# Patient Record
Sex: FEMALE | Race: AMERICAN INDIAN OR ALASKA NATIVE | NOT HISPANIC OR LATINO | Employment: UNEMPLOYED | URBAN - METROPOLITAN AREA
[De-identification: names, ages, dates, MRNs, and addresses within clinical notes are randomized per-mention and may not be internally consistent; named-entity substitution may affect disease eponyms.]

---

## 2017-01-19 ENCOUNTER — OFFICE VISIT (OUTPATIENT)
Dept: MATERNAL FETAL MEDICINE | Facility: CLINIC | Age: 42
End: 2017-01-19
Attending: OBSTETRICS & GYNECOLOGY
Payer: COMMERCIAL

## 2017-01-19 ENCOUNTER — HOSPITAL ENCOUNTER (OUTPATIENT)
Dept: ULTRASOUND IMAGING | Facility: CLINIC | Age: 42
Discharge: HOME | End: 2017-01-19
Attending: OBSTETRICS & GYNECOLOGY | Admitting: OBSTETRICS & GYNECOLOGY
Payer: COMMERCIAL

## 2017-01-19 ENCOUNTER — HOSPITAL ENCOUNTER (OUTPATIENT)
Facility: CLINIC | Age: 42
Discharge: HOME | End: 2017-01-19
Attending: OBSTETRICS & GYNECOLOGY | Admitting: OBSTETRICS & GYNECOLOGY
Payer: COMMERCIAL

## 2017-01-19 VITALS
SYSTOLIC BLOOD PRESSURE: 112 MMHG | HEART RATE: 77 BPM | TEMPERATURE: 97.8 F | DIASTOLIC BLOOD PRESSURE: 57 MMHG | RESPIRATION RATE: 18 BRPM

## 2017-01-19 DIAGNOSIS — O35.9XX0 SUSPECTED FETAL ANOMALY, ANTEPARTUM, NOT APPLICABLE OR UNSPECIFIED FETUS: Primary | ICD-10-CM

## 2017-01-19 DIAGNOSIS — O24.119 TYPE 2 DIABETES MELLITUS AFFECTING PREGNANCY, ANTEPARTUM: ICD-10-CM

## 2017-01-19 DIAGNOSIS — O24.119 PRE-EXISTING TYPE 2 DIABETES MELLITUS DURING PREGNANCY, ANTEPARTUM: ICD-10-CM

## 2017-01-19 LAB
ALBUMIN SERPL-MCNC: 2.4 G/DL (ref 3.4–5)
ALBUMIN UR-MCNC: 30 MG/DL
ALP SERPL-CCNC: 104 U/L (ref 40–150)
ALT SERPL W P-5'-P-CCNC: 15 U/L (ref 0–50)
AMPHETAMINES UR QL SCN: NORMAL
ANION GAP SERPL CALCULATED.3IONS-SCNC: 12 MMOL/L (ref 3–14)
APPEARANCE UR: CLEAR
AST SERPL W P-5'-P-CCNC: 15 U/L (ref 0–45)
BACTERIA #/AREA URNS HPF: ABNORMAL /HPF
BILIRUB SERPL-MCNC: 0.4 MG/DL (ref 0.2–1.3)
BILIRUB UR QL STRIP: ABNORMAL
BUN SERPL-MCNC: 6 MG/DL (ref 7–30)
CALCIUM SERPL-MCNC: 8.2 MG/DL (ref 8.5–10.1)
CANNABINOIDS UR QL: NORMAL
CHLORIDE SERPL-SCNC: 108 MMOL/L (ref 94–109)
CO2 SERPL-SCNC: 21 MMOL/L (ref 20–32)
COCAINE UR QL: NORMAL
COLOR UR AUTO: YELLOW
CREAT SERPL-MCNC: 0.45 MG/DL (ref 0.52–1.04)
ERYTHROCYTE [DISTWIDTH] IN BLOOD BY AUTOMATED COUNT: 12.8 % (ref 10–15)
GFR SERPL CREATININE-BSD FRML MDRD: ABNORMAL ML/MIN/1.7M2
GLUCOSE SERPL-MCNC: 116 MG/DL (ref 70–99)
GLUCOSE UR STRIP-MCNC: 70 MG/DL
HCT VFR BLD AUTO: 31.1 % (ref 35–47)
HGB BLD-MCNC: 10.5 G/DL (ref 11.7–15.7)
HGB UR QL STRIP: NEGATIVE
KETONES UR STRIP-MCNC: >150 MG/DL
LEUKOCYTE ESTERASE UR QL STRIP: NEGATIVE
LIPASE SERPL-CCNC: 75 U/L (ref 73–393)
MCH RBC QN AUTO: 27.8 PG (ref 26.5–33)
MCHC RBC AUTO-ENTMCNC: 33.8 G/DL (ref 31.5–36.5)
MCV RBC AUTO: 82 FL (ref 78–100)
MUCOUS THREADS #/AREA URNS LPF: PRESENT /LPF
NITRATE UR QL: NEGATIVE
OPIATES UR QL SCN: NORMAL
PCP UR QL SCN: NORMAL
PH UR STRIP: 6.5 PH (ref 5–7)
PLATELET # BLD AUTO: 322 10E9/L (ref 150–450)
POTASSIUM SERPL-SCNC: 3.6 MMOL/L (ref 3.4–5.3)
PROT SERPL-MCNC: 6.7 G/DL (ref 6.8–8.8)
RBC # BLD AUTO: 3.78 10E12/L (ref 3.8–5.2)
RBC #/AREA URNS AUTO: 0 /HPF (ref 0–2)
SODIUM SERPL-SCNC: 141 MMOL/L (ref 133–144)
SP GR UR STRIP: 1.02 (ref 1–1.03)
SQUAMOUS #/AREA URNS AUTO: 8 /HPF (ref 0–1)
URN SPEC COLLECT METH UR: ABNORMAL
UROBILINOGEN UR STRIP-MCNC: 4 MG/DL (ref 0–2)
WBC # BLD AUTO: 5.4 10E9/L (ref 4–11)
WBC #/AREA URNS AUTO: 0 /HPF (ref 0–2)

## 2017-01-19 PROCEDURE — 83690 ASSAY OF LIPASE: CPT | Performed by: OBSTETRICS & GYNECOLOGY

## 2017-01-19 PROCEDURE — 81001 URINALYSIS AUTO W/SCOPE: CPT | Performed by: OBSTETRICS & GYNECOLOGY

## 2017-01-19 PROCEDURE — 85027 COMPLETE CBC AUTOMATED: CPT | Performed by: OBSTETRICS & GYNECOLOGY

## 2017-01-19 PROCEDURE — 80053 COMPREHEN METABOLIC PANEL: CPT | Performed by: OBSTETRICS & GYNECOLOGY

## 2017-01-19 PROCEDURE — 25800025 ZZH RX 258: Performed by: OBSTETRICS & GYNECOLOGY

## 2017-01-19 PROCEDURE — 36415 COLL VENOUS BLD VENIPUNCTURE: CPT | Performed by: OBSTETRICS & GYNECOLOGY

## 2017-01-19 PROCEDURE — 25900017 H RX MED GY IP 259 OP 259 PS 637: Performed by: OBSTETRICS & GYNECOLOGY

## 2017-01-19 PROCEDURE — 76816 OB US FOLLOW-UP PER FETUS: CPT

## 2017-01-19 PROCEDURE — 25000125 ZZHC RX 250: Performed by: OBSTETRICS & GYNECOLOGY

## 2017-01-19 PROCEDURE — 80307 DRUG TEST PRSMV CHEM ANLYZR: CPT | Performed by: OBSTETRICS & GYNECOLOGY

## 2017-01-19 PROCEDURE — 96375 TX/PRO/DX INJ NEW DRUG ADDON: CPT

## 2017-01-19 PROCEDURE — 96360 HYDRATION IV INFUSION INIT: CPT

## 2017-01-19 PROCEDURE — 99214 OFFICE O/P EST MOD 30 MIN: CPT

## 2017-01-19 PROCEDURE — 25000132 ZZH RX MED GY IP 250 OP 250 PS 637: Performed by: OBSTETRICS & GYNECOLOGY

## 2017-01-19 PROCEDURE — 25000128 H RX IP 250 OP 636: Performed by: OBSTETRICS & GYNECOLOGY

## 2017-01-19 PROCEDURE — 96361 HYDRATE IV INFUSION ADD-ON: CPT

## 2017-01-19 PROCEDURE — 96374 THER/PROPH/DIAG INJ IV PUSH: CPT

## 2017-01-19 RX ORDER — METOCLOPRAMIDE 5 MG/1
5-10 TABLET ORAL 3 TIMES DAILY PRN
Status: DISCONTINUED | OUTPATIENT
Start: 2017-01-19 | End: 2017-01-19 | Stop reason: HOSPADM

## 2017-01-19 RX ORDER — LIDOCAINE 40 MG/G
CREAM TOPICAL
Status: DISCONTINUED | OUTPATIENT
Start: 2017-01-19 | End: 2017-01-19 | Stop reason: HOSPADM

## 2017-01-19 RX ORDER — SODIUM CHLORIDE, SODIUM LACTATE, POTASSIUM CHLORIDE, CALCIUM CHLORIDE 600; 310; 30; 20 MG/100ML; MG/100ML; MG/100ML; MG/100ML
INJECTION, SOLUTION INTRAVENOUS CONTINUOUS
Status: DISCONTINUED | OUTPATIENT
Start: 2017-01-19 | End: 2017-01-19 | Stop reason: HOSPADM

## 2017-01-19 RX ORDER — OXYTOCIN 10 [USP'U]/ML
INJECTION, SOLUTION INTRAMUSCULAR; INTRAVENOUS
Status: DISCONTINUED
Start: 2017-01-19 | End: 2017-01-19 | Stop reason: WASHOUT

## 2017-01-19 RX ORDER — ONDANSETRON 2 MG/ML
4 INJECTION INTRAMUSCULAR; INTRAVENOUS EVERY 6 HOURS PRN
Status: DISCONTINUED | OUTPATIENT
Start: 2017-01-19 | End: 2017-01-19 | Stop reason: HOSPADM

## 2017-01-19 RX ORDER — ALUMINA, MAGNESIA, AND SIMETHICONE 2400; 2400; 240 MG/30ML; MG/30ML; MG/30ML
30 SUSPENSION ORAL ONCE
Status: COMPLETED | OUTPATIENT
Start: 2017-01-19 | End: 2017-01-19

## 2017-01-19 RX ORDER — BUPRENORPHINE AND NALOXONE 8; 2 MG/1; MG/1
1 FILM, SOLUBLE BUCCAL; SUBLINGUAL ONCE
Status: DISCONTINUED | OUTPATIENT
Start: 2017-01-19 | End: 2017-01-19

## 2017-01-19 RX ORDER — METOCLOPRAMIDE HYDROCHLORIDE 5 MG/ML
10 INJECTION INTRAMUSCULAR; INTRAVENOUS EVERY 6 HOURS PRN
Status: DISCONTINUED | OUTPATIENT
Start: 2017-01-19 | End: 2017-01-19 | Stop reason: HOSPADM

## 2017-01-19 RX ORDER — OXYTOCIN/0.9 % SODIUM CHLORIDE 30/500 ML
PLASTIC BAG, INJECTION (ML) INTRAVENOUS
Status: DISCONTINUED
Start: 2017-01-19 | End: 2017-01-19 | Stop reason: WASHOUT

## 2017-01-19 RX ORDER — ONDANSETRON 4 MG/1
4-8 TABLET, FILM COATED ORAL EVERY 6 HOURS PRN
Status: DISCONTINUED | OUTPATIENT
Start: 2017-01-19 | End: 2017-01-19 | Stop reason: HOSPADM

## 2017-01-19 RX ORDER — BUPRENORPHINE AND NALOXONE 4; 1 MG/1; MG/1
3 FILM, SOLUBLE BUCCAL; SUBLINGUAL ONCE
Status: COMPLETED | OUTPATIENT
Start: 2017-01-19 | End: 2017-01-19

## 2017-01-19 RX ADMIN — ALUMINUM HYDROXIDE, MAGNESIUM HYDROXIDE, AND DIMETHICONE 30 ML: 400; 400; 40 SUSPENSION ORAL at 06:29

## 2017-01-19 RX ADMIN — ONDANSETRON 4 MG: 2 INJECTION INTRAMUSCULAR; INTRAVENOUS at 04:47

## 2017-01-19 RX ADMIN — METOCLOPRAMIDE 10 MG: 5 INJECTION, SOLUTION INTRAMUSCULAR; INTRAVENOUS at 05:47

## 2017-01-19 RX ADMIN — SODIUM CHLORIDE, POTASSIUM CHLORIDE, SODIUM LACTATE AND CALCIUM CHLORIDE 1000 ML: 600; 310; 30; 20 INJECTION, SOLUTION INTRAVENOUS at 05:40

## 2017-01-19 RX ADMIN — SODIUM CHLORIDE, POTASSIUM CHLORIDE, SODIUM LACTATE AND CALCIUM CHLORIDE 1000 ML: 600; 310; 30; 20 INJECTION, SOLUTION INTRAVENOUS at 04:45

## 2017-01-19 RX ADMIN — ONDANSETRON HYDROCHLORIDE 8 MG: 4 TABLET, FILM COATED ORAL at 10:08

## 2017-01-19 RX ADMIN — BUPRENORPHINE HYDROCHLORIDE, NALOXONE HYDROCHLORIDE 3 FILM: 4; 1 FILM, SOLUBLE BUCCAL; SUBLINGUAL at 05:54

## 2017-01-19 NOTE — PROGRESS NOTES
"Obstetrics Triage Note    CC: abdominal pain, nausea, vomiting    HPI:  Cindy Fisher is a 41 year old  female at 29w1d by 6w6d US who presents with abdominal pain, nausea and vomiting after not taking her PM dose of suboxone yesterday. She states \"I just didn't!\" when asked why she did not take her PM dose. She has a history of polysubstance abuse for which she is on suboxone. She receives this from [facility] daily. She denies relapse and has had many negative UDS screens this pregnancy. She is unable to state the current dosage that she takes.She has many dose adjustments during this pregnancy. History is limited bc she declines to engage in conversation more than minimal interaction. .    She reports nausea and vomiting for two days that has been worsening. Also reports epigastric/left sided abdominal pain that started yesterday. Unsure when last BM was. Denies diarrhea. Last ate yesterday. Denies fever, dysuria,     Reports + FM. Denies contractions, LOF, or vaginal bleeding.    Pharmacy was consulted to aid with dosing recommendation of suboxone. Appreciate recommendations.    Pregnancy complications:  1. Trisomy 21 fetus  2.  Opiate dependence;   - Three admissions for withdrawal, plus multiple triage visits for same.   - Currently taking 8-2 mg suboxone in AM, 4-1 suboxone in PM, per patient report   - She receives her medication from [facility] office. All UDS have been negative- no concern for relapse.   - In terms of her history and treatment doses, she was started on suboxone 4-1 in 2016 and had her dose increased when she went to inpatient treatment 10/27- to 8-2mg suboxone. Then on , her dose was increased to 10mg suboxone; however, patient did not like taking the increased dose but presented for withdrawal. Then on , she had her dose increased to 8-2 mg suboxone and the 4mg subutex as above. Emerson confirmed these dosage changes and her daily compliance. Most " "recent triage visit was  and her medications were changed to 8 mg subutex BID, suboxone was stopped.   3. Type II DM; takes lantus 70u AM, novolog with meals  4.  Advanced maternal age  5. Depression on zoloft 50 mg daily    ROS:  Negative except as mentioned in HPI. Limited due to patient declining to interact in discussion or answer questions.    PMH:  Past Medical History   Diagnosis Date     Type II or unspecified type diabetes mellitus without mention of complication, not stated as uncontrolled summer 2006     Diabetes mellitus     Migraine, unspecified, without mention of intractable migraine without mention of status migrainosus      Migraine     Anemia      Postpartum depression        PSHx:  Past Surgical History   Procedure Laterality Date     Cholecystectomy       Appendectomy         Medications:  Current Facility-Administered Medications   Medication     ondansetron (ZOFRAN) injection 4 mg     lidocaine 1 % 1 mL     lidocaine (LMX4) kit     sodium chloride (PF) 0.9% PF flush 3 mL     sodium chloride (PF) 0.9% PF flush 3 mL     NO Rho (D) immune globulin (RhoGam) needed - mother Rh POSITIVE     lactated ringers BOLUS 1,000 mL    Followed by     lactated ringers infusion     metoclopramide (REGLAN) injection 10 mg       Allergies:     Allergies   Allergen Reactions     Compazine      Feels \"in another world\"     Tramadol Rash     Tylenol With Codeine #3 [Acetaminophen-Codeine] Hives     Vicodin [Hydrocodone-Acetaminophen] Hives       Physical Exam:   Filed Vitals:    17 0330   BP: 117/71   Pulse: 77   Resp: 24      Gen: resting comfortably, in NAD  Pulm: non-labored breathing  Abd: soft, gravid, non-tender, non-distended  Cx: deferred    NST:  FHT: , moderate ben, +10x10 accels, no decels  Garber: quiet    Labs:  WBC 5.4  Hgb 10.5  Plts 322  Na 141  K 3.6  Cr 0.45  ALT 15  AST 15  Lipase 75  Glucose 116    UA pending  UDS pending    Assessment/Plan: 41 year old female  at 29w1d by " 6w6d US, here for withdrawal symptoms after not taking yesterday's PM suboxone dose. Pharmacy consulted to aid with dosing of suboxone, appreciate help and recommendations.     Cindy received IV hydration, IV zofran, and dose of 12-4 suboxone, with improvement of her symptoms. Laboratory work-up was unremarkable. Low suspicion for other etiology of symptoms. After feeling improved the patient was able to verbalize the dose she is currently taking. She reports taking 1 film (8-2) mg suboxone in the AM, and 1 small film (4-1) mg eight hours later. Multiple triage visits/admissions for same, again encouraged patient not to miss any doses.  - FHT cat I, appropriate for gestational age  - Patient signed out to day team pending UA, UDS  - Plan for discharge with previously scheduled [facility] US this morning.  - Patient plans to  her PM dose of suboxone today.       Aliza Chavira MD PGY3  Department of OB/GYN  1/19/2017      Physician Attestation  IYolette DO, saw and evaluated Cindy Fisher with the resident.    I personally reviewed the vital signs, medications, labs and imaging.    My key history or physical exam findings:   Patient feeling much better after receiving subutex.  Discussed glucose control briefly.  Patient is scheduled for follow up ultrasound in [facility] later this morning.  Tried to determine reason patient missed evening subutex dose.  She states she is nauseated in the evenings.  Encouraged her to discuss these symptoms with her provider.  She may require a dose increase.      Key management decisions made by me: discharge.  Go to [facility] appt at 11:45.  Recommended co-management with [facility] and [facility].      Yolette Boland DO  Date of Service (when I saw the patient): 1/19/17    Time Spent on This Encounter  IYolette DO, spent a total of 25 minutes bedside and on the inpatient unit today managing the care of Cindy Fisher.  Over 50% of my time on the unit was spent  counseling the patient and /or coordinating care. See note for details.

## 2017-01-19 NOTE — PLAN OF CARE
Patient feeling better and able to keep fluids down. Dr. Boland and Taylor at bedside to evaluate patient. Patient will eat breakfast and then will discharge. Patient has an appointment today at 1145 in clinic and for an ultrasound. IV discontinued as patient is able to take fluids by mouth.

## 2017-01-19 NOTE — PLAN OF CARE
Problem: Goal Outcome Summary  Goal: Goal Outcome Summary  Outcome: No Change  Data: [facility]30.   Reason for maternal/fetal assessment per patient is abdominal pain/left upper quadrant pain, nausea and vomiting  Patient is a . Prenatal record reviewed.      Obstetric History       T3      TAB0   SAB0   E0   M0   L3        # Outcome Date GA Lbr Alpesh/2nd Weight Sex Delivery Anes PTL Lv   4 Current                     3 Term  37w0d   2.75 kg (6 lb 1 oz)        Y   2 Term 96 38w0d   2.977 kg (6 lb 9 oz) F      Y   1 Term 93 40w0d   4.252 kg (9 lb 6 oz) M      Y       . Medical history:   Past Medical History   Diagnosis Date     Type II or unspecified type diabetes mellitus without mention of complication, not stated as uncontrolled summer 2006       Diabetes mellitus     Migraine, unspecified, without mention of intractable migraine without mention of status migrainosus         Migraine     Anemia       Postpartum depression     . Gestational Age 29w1d. VSS. Fetal movement present-felt decreased fetal movement prior to arriving at hospital. Patient complains of cramping, abdominal pain, upper left quadrant pain and n/v since yesterday that progressively worsened all day/night. Patient states the last time she was able to drink was 2 pm on  and the last time she ate solid food was the .  Patient denies backache, vaginal discharge, pelvic pressure, UTI symptoms, GI problems, bloody show, vaginal bleeding, edema, headache, visual disturbances, epigastric or URQ pain or rupture of membranes. Support persons not currently present.  Action: Verbal consent for EFM. Triage assessment completed. EFM applied; , accelerations present. Uterine assessment: intermittent contractions: toco adjusted several times. Fetal assessment: Presumed adequate fetal oxygenation documented (see flow record).   Response: Dr. Chavira informed of patient  arrival. Plan per provider is fluid bolus, zofran, potentially give subutex (patient missed nighttime dose). Patient verbalized agreement with plan. Will continue watching patient closely.    IV hydration for n/v  IV start time 0445  IV stop time ????

## 2017-01-19 NOTE — DISCHARGE INSTRUCTIONS
Discharge Instruction for Undelivered Patients      You were seen for: Nausea, vomitting, subutex dose  We Consulted: Dr. Chavira, Dr. Boland  You had (Test or Medicine): Fetal monitoring, subutex dose, IV fluids, nausea medications     Diet:   Drink 8 to 12 glasses of liquids (milk, juice, water) every day.  You may eat meals and snacks.  To manager your diabetes, follow the guidelines for eating and drinking given to you by your Clinic Provider or Diabetes Educator.       Activity:  Count fetal kicks everyday (see handout)  Call your doctor or nurse midwife if your baby is moving less than usual.     Call your provider if you notice:  Swelling in your face or increased swelling in your hands or legs.  Headaches that are not relieved by Tylenol (acetaminophen).  Changes in your vision (blurring: seeing spots or stars.)  Nausea (sick to your stomach) and vomiting (throwing up).   Weight gain of 5 pounds or more per week.  Heartburn that doesn't go away.  Signs of bladder infection: pain when you urinate (use the toilet), need to go more often and more urgently.  The bag of tobin (rupture of membranes) breaks, or you notice leaking in your underwear.  Bright red blood in your underwear.  Abdominal (lower belly) or stomach pain.  Second (plus) baby: Contractions (tightening) less than 10 minutes apart and getting stronger.  *If less than 34 weeks: Contractions (tightenings) more than 6 times in one hour.  Increase or change in vaginal discharge (note the color and amount)  Other: Please remember to take your subutex as prescribed and DO NOT SKIP DOSES.    Follow-up:  As scheduled in the clinic, make your regular appointments.

## 2017-01-19 NOTE — PROGRESS NOTES
Please refer to ultrasound report under 'Imaging' Studies of 'Chart Review' tabs.    Hiram Winters M.D.

## 2017-01-19 NOTE — PLAN OF CARE
Patient able to eat solid food after oral zofran. Patient feeling much better now and is ready to go to clinic for her appointment. Discharge paperwork reviewed and patient does not offer concerns at this time. Patient discharged ambulatory at 1100.

## 2017-01-19 NOTE — IP AVS SNAPSHOT
[id number]    [address] [place] [address]    Phone:  [phone]                                       After Visit Summary   1/19/2017    Cindy Fisher    MRN: [id number]           After Visit Summary Signature Page     I have received my discharge instructions, and my questions have been answered. I have discussed any challenges I see with this plan with the nurse or doctor.    ..........................................................................................................................................  Patient/Patient Representative Signature      ..........................................................................................................................................  Patient Representative Print Name and Relationship to Patient    ..................................................               ................................................  Date                                            Time    ..........................................................................................................................................  Reviewed by Signature/Title    ...................................................              ..............................................  Date                                                            Time

## 2017-01-19 NOTE — IP AVS SNAPSHOT
MRN:[id number]                      After Visit Summary   1/19/2017    Cindy Fisher    MRN: [id number]           Thank you!     Thank you for choosing [facility] for your care. Our goal is always to provide you with excellent care. Hearing back from our patients is one way we can continue to improve our services. Please take a few minutes to complete the written survey that you may receive in the mail after you visit with us. Thank you!        Patient Information     Date Of Birth          1975        About your hospital stay     You were admitted on:  January 19, 2017 [facility]    You were discharged on:  January 19, 2017        Reason for your hospital stay       Withdrawal causing nausea and vomiting.                  Who to Call     For medical emergencies, please call 911.  For non-urgent questions about your medical care, please call your primary care provider or clinic, [phone]          Attending Provider     Provider    Callie Stern MD Jacobs, Yolette Hernandez,        Primary Care Provider Office Phone # Fax #    Farideh Cabello [phone]       [facility] [address] [facility] [address]        After Care Instructions     Activity       Your activity upon discharge: As tolerated.            Diet       Follow this diet upon discharge: Diabetic diet            Monitor and record       When your symptoms of nausea are occuring. You may need an dose adjustment of your suboxone.                  Follow-up Appointments     Adult [facility] Follow-up and recommended labs and tests       [facility] [place] app[place] 11:45 today.   Please call [facility] and clarify your appointment time today.     Appointments on [place] and/or [facility] (with [facility] or [facility] provider or service). Call [phone] if you haven't heard regarding these appointments within 7 days of discharge.                  Your next 10  appointments already scheduled     Jan 19, 2017 11:45 AM   ELEAZAR US COMPRE SINGLE F/U with [id number]   [facility] [place] ([facility])    [address]  [facility] [id number] [phone]           Wear comfortable clothes and leave your valuables at home.            Jan 19, 2017 12:15 PM   Radiology MD with UR ELEAZAR ROMANO   [facility])    [address]  [facility] [address]   [phone]           Please arrive at the time given for your first appointment.  This visit is used internally to schedule the physician's time during your ultrasound.              Further instructions from your care team       Discharge Instruction for Undelivered Patients      You were seen for: Nausea, vomitting, subutex dose  We Consulted: Dr. Chavira, Dr. Boland  You had (Test or Medicine): Fetal monitoring, subutex dose, IV fluids, nausea medications     Diet:   Drink 8 to 12 glasses of liquids (milk, juice, water) every day.  You may eat meals and snacks.  To manager your diabetes, follow the guidelines for eating and drinking given to you by your Clinic Provider or Diabetes Educator.       Activity:  Count fetal kicks everyday (see handout)  Call your doctor or nurse midwife if your baby is moving less than usual.     Call your provider if you notice:  Swelling in your face or increased swelling in your hands or legs.  Headaches that are not relieved by Tylenol (acetaminophen).  Changes in your vision (blurring: seeing spots or stars.)  Nausea (sick to your stomach) and vomiting (throwing up).   Weight gain of 5 pounds or more per week.  Heartburn that doesn't go away.  Signs of bladder infection: pain when you urinate (use the toilet), need to go more often and more urgently.  The bag of tobin (rupture of membranes) breaks, or you notice leaking in your  "underwear.  Bright red blood in your underwear.  Abdominal (lower belly) or stomach pain.  Second (plus) baby: Contractions (tightening) less than 10 minutes apart and getting stronger.  *If less than 34 weeks: Contractions (tightenings) more than 6 times in one hour.  Increase or change in vaginal discharge (note the color and amount)  Other: Please remember to take your subutex as prescribed and DO NOT SKIP DOSES.    Follow-up:  As scheduled in the clinic, make your regular appointments.          Pending Results     No orders found from 2017 to 2017.            Statement of Approval     Ordered          17 0947  I have reviewed and agree with all the recommendations and orders detailed in this document.   EFFECTIVE NOW     Approved and electronically signed by:  Melody Vazquez MD             Admission Information        Provider Department Dept Phone    2017 Yolette Boland, , DO Ur 4cob [phone]      Your Vitals Were     Blood Pressure Pulse Temperature Respirations          112/57 mmHg 77 97.8  F (36.6  C) (Oral) 18        MyChart Information     [employer] lets you send messages to your doctor, view your test results, renew your prescriptions, schedule appointments and more. To sign up, go to [email or web address] . Click on \"Log in\" on the left side of the screen, which will take you to the Welcome page. Then click on \"Sign up Now\" on the right side of the page.     You will be asked to enter the access code listed below, as well as some personal information. Please follow the directions to create your username and password.     Your access code is: [email or web address]  Expires: 3/11/2017  2:23 PM     Your access code will  in 90 days. If you need help or a new code, please call your [facility] or [phone].        Care EveryWhere ID     This is your Care EveryWhere ID. This could be used by other organizations to access your [facility] medical " records  [id number]           Review of your medicines      CONTINUE these medicines which have NOT CHANGED        Dose / Directions    acetaminophen 325 MG tablet   Commonly known as:  TYLENOL   Used for:  Withdrawal from opioids (H)        Dose:  650 mg   Take 2 tablets (650 mg) by mouth every 4 hours as needed for mild pain   Quantity:  100 tablet   Refills:  0       blood glucose monitoring lancets   Used for:  Type 2 diabetes mellitus with complication, unspecified long term insulin use status (H)        Use to test blood sugar 4 times daily or as directed.   Quantity:  1 Box   Refills:  0       blood glucose monitoring meter device kit   Used for:  Type 2 diabetes mellitus with complication, unspecified long term insulin use status (H)        Use to test blood sugar 4 times daily or as directed.   Quantity:  1 kit   Refills:  0       buprenorphine HCl-naloxone HCl 8-2 MG per film   Commonly known as:  SUBOXONE        Dose:  1 Film   Place 1 Film under the tongue daily   Refills:  0       LEVEMIR FLEXPEN/FLEXTOUCH 100 UNIT/ML injection   Indication:  Type 2 Diabetes   Generic drug:  insulin detemir        Dose:  70 Units   Inject 70 Units Subcutaneous every morning   Refills:  0       NovoLOG FLEXPEN 100 UNIT/ML injection   Indication:  Type 2 Diabetes   Generic drug:  insulin aspart        Inject Subcutaneous 3 times daily (with meals)   Refills:  0       ranitidine 150 MG tablet   Commonly known as:  ZANTAC   Used for:  Pregnant state, incidental        Dose:  150 mg   Take 1 tablet (150 mg) by mouth 2 times daily   Quantity:  60 tablet   Refills:  1       sennosides 8.6 MG tablet   Commonly known as:  SENOKOT   Used for:  Withdrawal complaint        Dose:  1 tablet   Take 1 tablet by mouth 2 times daily   Quantity:  60 tablet   Refills:  1       sertraline 50 MG tablet   Commonly known as:  ZOLOFT   Used for:  Withdrawal from opioids (H)        Dose:  50 mg   Take 1 tablet (50 mg) by mouth daily    Quantity:  60 tablet   Refills:  0                Protect others around you: Learn how to safely use, store and throw away your medicines at www.disposemymeds.org.             Medication List: This is a list of all your medications and when to take them. Check marks below indicate your daily home schedule. Keep this list as a reference.      Medications           Morning Afternoon Evening Bedtime As Needed    acetaminophen 325 MG tablet   Commonly known as:  TYLENOL   Take 2 tablets (650 mg) by mouth every 4 hours as needed for mild pain                                blood glucose monitoring lancets   Use to test blood sugar 4 times daily or as directed.                                blood glucose monitoring meter device kit   Use to test blood sugar 4 times daily or as directed.                                buprenorphine HCl-naloxone HCl 8-2 MG per film   Commonly known as:  SUBOXONE   Place 1 Film under the tongue daily                                LEVEMIR FLEXPEN/FLEXTOUCH 100 UNIT/ML injection   Inject 70 Units Subcutaneous every morning   Generic drug:  insulin detemir                                NovoLOG FLEXPEN 100 UNIT/ML injection   Inject Subcutaneous 3 times daily (with meals)   Generic drug:  insulin aspart                                ranitidine 150 MG tablet   Commonly known as:  ZANTAC   Take 1 tablet (150 mg) by mouth 2 times daily                                sennosides 8.6 MG tablet   Commonly known as:  SENOKOT   Take 1 tablet by mouth 2 times daily                                sertraline 50 MG tablet   Commonly known as:  ZOLOFT   Take 1 tablet (50 mg) by mouth daily                                          More Information        Kick Counts  It s normal to worry about your baby s health. One way you can know your baby s doing well is to record the baby s movements once a day. This is called a kick count. You will usually feel your baby move by the 20th week of pregnancy.  Remember to take your kick count records to all your appointments with your healthcare provider.       How to Count Kicks    Choose a time when the baby is active, such as after a meal.     Sit comfortably or lie on your side.     The first time the baby moves, write down the time.     Count each movement until the baby has moved 10 times. This can take from 20 minutes to 2 hours.     If the baby hasn t moved 4 times in 1 hour, pat your stomach to wake the baby up.    Write down the time you feel the baby s 10th movement.    Try to do it at the same time each day.  When to Call Your Healthcare Provider  Call your healthcare provider right away if you notice any of the following:    Your baby moves fewer than 10 times in 2 hours while you re doing kick counts.    Your baby moves much less often than on the days before.    You have not felt your baby move all day.    [id number] [place] [address]. All rights reserved. This information is not intended as a substitute for professional medical care. Always follow your healthcare professional's instructions.            Patient Education    Buprenorphine Hydrochloride Oral dissolving film    Buprenorphine Hydrochloride Solution for injection    Buprenorphine Hydrochloride Sublingual tablet    Buprenorphine Transdermal Patch - weekly  Buprenorphine Hydrochloride Oral dissolving film  What is this medicine?  BUPRENORPHINE (byoo pre NOR feen) is a pain reliever. It is used to treat moderate to severe pain.  This medicine may be used for other purposes; ask your health care provider or pharmacist if you have questions.  What should I tell my health care provider before I take this medicine?  They need to know if you have any of these conditions:    blockage in your bowel    brain tumor    drink more than 3 alcohol-containing drinks per day    drug abuse or addiction    head injury    kidney disease    liver disease    lung or breathing  disease, like asthma    mouth sores    thyroid disease    trouble passing urine or change in the amount of urine    an unusual or allergic reaction to buprenorphine, other medicines, foods, dyes, or preservatives    pregnant or trying to get pregnant    breast-feeding  How should I use this medicine?  Take this medicine by mouth. Follow the directions on the prescription label. Wet the inside of your cheek with tongue or rinse mouth with water before using this medicine. Open package with dry hands just before you are ready to use. Do not cut or tear the film. Use the tip of a dry finger to put film in the mouth with the yellow side of the film facing the cheek. Hold the film in place for 5 seconds. After you place the medicine on your cheek leave the film in place until it dissolves away in about 30 minutes. Do not move or touch the film with fingers or tongue. Do not eat or drink until the film has dissolved. Do not chew or swallow the film. Take your medicine at regular intervals. Do not take your medicine more often than directed. Do not stop taking except on your doctor's advice.  A special MedGuide will be given to you by the pharmacist with each prescription and refill. Be sure to read this information carefully each time.  Talk to your pediatrician regarding the use of this medicine in children. Special care may be needed.  Overdosage: If you think you have taken too much of this medicine contact a poison control center or emergency room at once.  NOTE: This medicine is only for you. Do not share this medicine with others.  What if I miss a dose?  If you miss a dose, take it as soon as you can. If it is almost time for your next dose, take only that dose. Do not take double or extra doses.  What may interact with this medicine?  This medicine may interact with the following medications:    alcohol    antibiotics like clarithromycin, dalfopristin; quinupristin, erythromycin, and rifampin    antihistamines for  allergy, cough, and cold    antiviral medicines for HIV or AIDS    atropine    butorphanol    certain medicines for anxiety or sleep    certain medicines for bladder problems like oxybutynin, tolterodine    certain medicines for blood pressure, heart disease, irregular heart beat    certain medicines for depression, anxiety, or psychotic disturbances    certain medicines for fungal infections like ketoconazole and itraconazole    certain medicines for Parkinson's disease like benztropine, trihexyphenidyl    certain medicines for seizures like carbamazepine, phenobarbital, phenytoin    certain medicines for stomach problems like cimetidine, dicyclomine, hyoscyamine    certain medicines for travel sickness like scopolamine    diuretics    general anesthetics    mifepristone    muscle relaxants    nalbuphine    narcotic medicines for pain    nilotinib    pentazocine    phenothiazines like chlorpromazine, mesoridazine, prochlorperazine, thioridazine    ranolazine  This list may not describe all possible interactions. Give your health care provider a list of all the medicines, herbs, non-prescription drugs, or dietary supplements you use. Also tell them if you smoke, drink alcohol, or use illegal drugs. Some items may interact with your medicine.  What should I watch for while using this medicine?  Tell your doctor or health care professional if your pain does not go away, if it gets worse, or if you have a new or a different type of pain. You may develop tolerance to the medicine. Tolerance means that you will need a higher dose of the medicine for pain relief. Tolerance is normal and is expected if you take the medicine for a long time.  Do not suddenly stop taking your medicine because you may develop a severe reaction. Your body becomes used to the medicine. This does NOT mean you are addicted. Addiction is a behavior related to getting and using a drug for a non-medical reason. If you have pain, you have a medical  reason to take pain medicine. Your doctor will tell you how much medicine to take. If your doctor wants you to stop the medicine, the dose will be slowly lowered over time to avoid any side effects.  You may get drowsy or dizzy. Do not drive, use machinery, or do anything that needs mental alertness until you know how this medicine affects you. Do not stand or sit up quickly, especially if you are an older patient. This reduces the risk of dizzy or fainting spells. Alcohol may interfere with the effect of this medicine. Avoid alcoholic drinks.  There are different types of narcotic medicines (opiates) for pain. If you take more than one type at the same time, you may have more side effects. Give your health care provider a list of all medicines you use. Your doctor will tell you how much medicine to take. Do not take more medicine than directed. Call emergency for help if you have problems breathing.  The medicine will cause constipation. Try to have a bowel movement at least every 2 to 3 days. If you do not have a bowel movement for 3 days, call your doctor or health care professional.  Your mouth may get dry. Chewing sugarless gum or sucking hard candy, and drinking plenty of water may help. Contact your doctor if the problem does not go away or is severe.  What side effects may I notice from receiving this medicine?  Side effects that you should report to your doctor or health care professional as soon as possible:    allergic reactions like skin rash, itching or hives, swelling of the face, lips, or tongue    anxious    breathing problems    chills    confusion    dark yellow or brown urine; general ill feeling or flu-like symptoms; light-colored stools; loss of appetite; nausea; right upper belly pain; unusually weak or tired; yellow of the eyes or skin    diarrhea    feeling faint or lightheaded, falls    irritable    stomach pain    swelling of ankles, feet, hands    trouble passing urine or change in the  amount of urine  Side effects that usually do not require medical attention (report these to your doctor or health care professional if they continue or are bothersome):    constipation    dry mouth    headache    nausea, vomiting    trouble sleeping  This list may not describe all possible side effects. Call your doctor for medical advice about side effects. You may report side effects to FDA at 6-196-YJZ-3469.  Where should I keep my medicine?  Keep out of the reach of children.  This medicine can be abused. Keep your medicine in a safe place to protect it from theft. Do not share this medicine with anyone. Selling or giving away this medicine is dangerous and against the law. Follow the directions in the [employer]..  Store at room temperature between 15 and 30 degrees C (59 and 86 degrees F). This medicine may cause accidental overdose and death if it is taken by other adults, children, or pets. Remove any unused films from the foil packs and flush any down the toilet to reduce the chance of harm. Throw away the empty foil packaging in the trash. Do not use the medicine after the expiration date.  NOTE: This sheet is a summary. It may not cover all possible information. If you have questions about this medicine, talk to your doctor, pharmacist, or health care provider.  NOTE:This sheet is a summary. It may not cover all possible information. If you have questions about this medicine, talk to your doctor, pharmacist, or health care provider. Copyright  2016 [employer]

## 2017-01-19 NOTE — PHARMACY
Suboxone Consult    Accessed [facility] website per provider consult regarding Suboxone dose patient receives. Looks like patient takes 3 films per day of the 8-2mg strength. Tried calling [facility] to confirm the instructions on the prescription, but they were unable to find the patient's current profile for some odd reason. [facility] not [facility] since that pharmacy was closed. Unsure if patient takes all of the films in one dose or spreads them out throughout the day. Discussed findings with Dr. Chavira. She recalls the patient might have mentioned taking 12mg per dose, which would be 1.5 films twice per day. Recommend at this time giving a one time dose of Suboxone 12-3mg. Will have morning pharmacist call [facility] to verify the instructions when they open.     Jyothi Zarco, Pharm.D.

## 2017-01-19 NOTE — PROGRESS NOTES
Pt nausea/vomiting/malaise improved after suboxone film from last night. Able to tolerate po this morning. She didn't take her pill last night because she was already feeling too nauseous. Reports that overall her n/v has improved with split dosing (8mg am, 4mg pm), but she has had issues the past two days with am nausea and evening nausea. Her son has a cough so she thinks it may be due to this. Discussed that it is normal to need dose adjustments throughout pregnancy. Has an appt with Dr. Stern (?) this afternoon at [facility] and will address this. She plans to  her evening 4mg this afternoon. She continues to  daily.   Reports that her blood sugars are 60-70s in the and 110s after eating. She gets her blood sugar checked when she picks up her suboxone, but now has a meter at home. Dr. Stern from [facility] also manages her insulin and thinks she may be due for a dose decrease. Prior to pregnancy she reports her HgbA1c was 15 and typical blood sugars were 500-800s. She was drinking 24 cans of Mountain Dew a day, but has cut down to 1 can a day. Welcomed pt to make an appt in [facility] for co-management of her diabetes and other medical co-morbidities, but patient prefers to see Dr. Stern.   Ok to discharge to home.   Melody Vazquez  OB/GYN Resident  PGY-3

## 2017-02-02 ENCOUNTER — TRANSFERRED RECORDS (OUTPATIENT)
Dept: HEALTH INFORMATION MANAGEMENT | Facility: CLINIC | Age: 42
End: 2017-02-02

## 2017-02-05 ENCOUNTER — HOSPITAL ENCOUNTER (OUTPATIENT)
Facility: CLINIC | Age: 42
Discharge: HOME | End: 2017-02-05
Attending: OBSTETRICS & GYNECOLOGY | Admitting: OBSTETRICS & GYNECOLOGY
Payer: COMMERCIAL

## 2017-02-05 VITALS
WEIGHT: 167 LBS | BODY MASS INDEX: 28.51 KG/M2 | HEIGHT: 64 IN | RESPIRATION RATE: 20 BRPM | DIASTOLIC BLOOD PRESSURE: 76 MMHG | TEMPERATURE: 97.6 F | HEART RATE: 79 BPM | SYSTOLIC BLOOD PRESSURE: 126 MMHG

## 2017-02-05 LAB
ALBUMIN UR-MCNC: NEGATIVE MG/DL
AMPHETAMINES UR QL SCN: NORMAL
APPEARANCE UR: CLEAR
BILIRUB UR QL STRIP: NEGATIVE
CANNABINOIDS UR QL: NORMAL
COCAINE UR QL: NORMAL
COLOR UR AUTO: YELLOW
GLUCOSE BLDC GLUCOMTR-MCNC: 78 MG/DL (ref 70–99)
GLUCOSE UR STRIP-MCNC: NEGATIVE MG/DL
HGB UR QL STRIP: NEGATIVE
KETONES UR STRIP-MCNC: 10 MG/DL
LEUKOCYTE ESTERASE UR QL STRIP: NEGATIVE
MICRO REPORT STATUS: NORMAL
MUCOUS THREADS #/AREA URNS LPF: PRESENT /LPF
NITRATE UR QL: NEGATIVE
OPIATES UR QL SCN: NORMAL
PCP UR QL SCN: NORMAL
PH UR STRIP: 6.5 PH (ref 5–7)
RBC #/AREA URNS AUTO: <1 /HPF (ref 0–2)
SP GR UR STRIP: 1.02 (ref 1–1.03)
SPECIMEN SOURCE: NORMAL
SQUAMOUS #/AREA URNS AUTO: 1 /HPF (ref 0–1)
URN SPEC COLLECT METH UR: ABNORMAL
UROBILINOGEN UR STRIP-MCNC: 2 MG/DL (ref 0–2)
WBC #/AREA URNS AUTO: 1 /HPF (ref 0–2)
WET PREP SPEC: NORMAL

## 2017-02-05 PROCEDURE — 81001 URINALYSIS AUTO W/SCOPE: CPT | Performed by: OBSTETRICS & GYNECOLOGY

## 2017-02-05 PROCEDURE — 25000128 H RX IP 250 OP 636: Performed by: OBSTETRICS & GYNECOLOGY

## 2017-02-05 PROCEDURE — 25800025 ZZH RX 258: Performed by: OBSTETRICS & GYNECOLOGY

## 2017-02-05 PROCEDURE — 82962 GLUCOSE BLOOD TEST: CPT

## 2017-02-05 PROCEDURE — 99214 OFFICE O/P EST MOD 30 MIN: CPT

## 2017-02-05 PROCEDURE — 87210 SMEAR WET MOUNT SALINE/INK: CPT | Performed by: OBSTETRICS & GYNECOLOGY

## 2017-02-05 PROCEDURE — 80307 DRUG TEST PRSMV CHEM ANLYZR: CPT | Performed by: OBSTETRICS & GYNECOLOGY

## 2017-02-05 PROCEDURE — 25000132 ZZH RX MED GY IP 250 OP 250 PS 637: Performed by: OBSTETRICS & GYNECOLOGY

## 2017-02-05 RX ORDER — ACETAMINOPHEN 500 MG
500-1000 TABLET ORAL EVERY 6 HOURS PRN
Status: DISCONTINUED | OUTPATIENT
Start: 2017-02-05 | End: 2017-02-05 | Stop reason: HOSPADM

## 2017-02-05 RX ORDER — SODIUM CHLORIDE, SODIUM LACTATE, POTASSIUM CHLORIDE, CALCIUM CHLORIDE 600; 310; 30; 20 MG/100ML; MG/100ML; MG/100ML; MG/100ML
INJECTION, SOLUTION INTRAVENOUS
Status: DISCONTINUED
Start: 2017-02-05 | End: 2017-02-05 | Stop reason: HOSPADM

## 2017-02-05 RX ORDER — METOCLOPRAMIDE HYDROCHLORIDE 5 MG/ML
10 INJECTION INTRAMUSCULAR; INTRAVENOUS EVERY 6 HOURS
Status: DISCONTINUED | OUTPATIENT
Start: 2017-02-05 | End: 2017-02-05 | Stop reason: HOSPADM

## 2017-02-05 RX ADMIN — ACETAMINOPHEN 500 MG: 500 TABLET ORAL at 12:33

## 2017-02-05 RX ADMIN — METOCLOPRAMIDE 10 MG: 5 INJECTION, SOLUTION INTRAMUSCULAR; INTRAVENOUS at 14:47

## 2017-02-05 RX ADMIN — SODIUM CHLORIDE, POTASSIUM CHLORIDE, SODIUM LACTATE AND CALCIUM CHLORIDE 500 ML: 600; 310; 30; 20 INJECTION, SOLUTION INTRAVENOUS at 14:47

## 2017-02-05 NOTE — PLAN OF CARE
Pt to [facility] via ambulance for N&V. Reports emesis x 6-7 in last 90 minutes. Takes subutex BID last dose 0945. Also reports lower abd pain, lower middle back pain, some pain with urination and UTI 3 weeks ago that was treated. Pain with palp on abd and back. To her knowledge, has not been around others with N&V. MD in to see pt and will prescribe zofran. Hot packs given. Oral hydration enc. And pt prefers to IV hydration at this time.

## 2017-02-05 NOTE — PROGRESS NOTES
"[facility]  OB Triage Note    CC: Nausea/vomiting, abdominal pain    HPI: Ms. Cindy Fisher is a 41 year old  at 31w4d by 6w6d US who presents to triage for nausea/vomiting that started this morning as well as lower abdominal pain for the past two days. She has a history of polysubstance abuse for which she is on suboxone, which she took this morning at 9:45. She said that she felt nauseous after taking it and threw up once on the way to the hospital. She had a bowl of cereal this morning. Also reports lower abdominal pain that is sharp and worse when she walks around. She also has some white vaginal discharge, no burning or itching. No dysuria but was treated a few weeks ago for a UTI. Denies diarrhea or constipation. No concern for STI.     Obstetric Complications  1. Trisomy 21 fetus  2.  Opiate dependence;    - Three admissions for withdrawal, plus multiple triage visits for same.    - Currently taking 8-2 mg suboxone in AM, 4-1 suboxone in PM, per patient report. She was started on suboxone in 2016   - She receives her medication from [facility]. All UDS have been negative- no concern for relapse.    3. Type II DM; takes lantus 70u AM, novolog with meals  4.  Advanced maternal age  5. Depression on zoloft 50 mg daily    O:  Patient Vitals for the past 24 hrs:   BP Temp Temp src Pulse Height Weight   17 1206 - - - - 1.626 m (5' 4\") 75.751 kg (167 lb)   17 1156 115/75 mmHg 98.2  F (36.8  C) Oral 79 - -     Gen: Well-appearing, NAD  CV: RRR, no murmurs  Pulm: CTAB, no wheezes  Abd: Soft, gravid, nontender to palpation, nondistended  MSK: no CVA tenderness  Ext: Warm and well perfused, trace LE edema b/l    Spec: Multiparous cervix, minimal white discharge, no bleeding  Cervix: Closed, 50% effaced, -3, posterior, soft    FHT: Baseline 115, moderate variability, 15 x 15 accels, no decels  [place]: Irritable    Labs:  Component      Latest Ref Rng " 2017   Color Urine       Yellow   Appearance Urine       Clear   Glucose Urine      NEG mg/dL Negative   Bilirubin Urine      NEG Negative   Ketones Urine      NEG mg/dL 10 (A)   Specific Gravity Urine      1.003 - 1.035 1.016   Blood Urine      NEG Negative   pH Urine      5.0 - 7.0 pH 6.5   Protein Albumin Urine      NEG mg/dL Negative   Urobilinogen mg/dL      0.0 - 2.0 mg/dL 2.0   Nitrite Urine      NEG Negative   Leukocyte Esterase Urine      NEG Negative   Source       Midstream Urine   WBC Urine      0 - 2 /HPF 1   RBC Urine      0 - 2 /HPF <1   Squamous Epithelial /HPF Urine      0 - 1 /HPF 1   Mucous Urine      NEG /LPF Present (A)   Amphetamine Qual Urine      NEG Negative . . .   Cannabinoids Qual Urine      NEG Negative . . .   Cocaine Qual Urine      NEG Negative . . .   Opiates Qualitative Urine      NEG Negative . . .   Pcp Qual Urine      NEG Negative . . .     Component      Latest Ref Rng 2017   Glucose      70 - 99 mg/dL 78       Wet prep: many PMNs, no clue cells, no trichomonas, no yeast    A/P:  Ms. Cindy Fisher is a 41 year old  at 31w4d by 6w6d US who presents to triage with nausea, vomiting and abdominal pain after taking suboxone.     Nausea/vomiting:  - Improved with IV zofran and IV reglan and IV fluid bolus  - Able to tolerate PO prior to discharge  - Patient has history of n/v with suboxone and splitting doses has helped with symptoms    Abdominal pain:   - Improved somewhat with warm packs and tylenol  - No evidence of  labor as cervix is closed  - wet prep negative, UA negative  -  labor precautions discussed, patient knows when to call/come back    FWB:  - Category 1 FHT, reactive  - Reassuring fetal status    Dispo:  - Discharge home. Has prenatal appt tomorrow with [facility].     Patient staffed with Dr. Leary.    Kimberley Hall MD  Ob/Gyn, PGY-3  2017, 12:26 PM    The patient was reviewed with Dr. Hall.  I agree with the above assessment and  plan.    Bette Leary MD, FACOG

## 2017-02-05 NOTE — PROVIDER NOTIFICATION
02/05/17 1425   Provider Notification   Provider Name/Title Dr. Hall   Method of Notification In Department   Request Evaluate - Remote   Notification Reason Other (Comment)   patient c/o continued pain and vomitting now.

## 2017-02-05 NOTE — PLAN OF CARE
Patient is feeling better after reglan but still complaining of lower abdominal pain.  Hot packs applied to abdomen.  Informed patient that her doctor is in surgery but will come see her asap.

## 2017-02-05 NOTE — IP AVS SNAPSHOT
MRN:[id number]                      After Visit Summary   2/5/2017    Cindy Fisher    MRN: [id number]           Thank you!     Thank you for choosing [facility] for your care. Our goal is always to provide you with excellent care. Hearing back from our patients is one way we can continue to improve our services. Please take a few minutes to complete the written survey that you may receive in the mail after you visit with us. Thank you!        Patient Information     Date Of Birth          1975        About your hospital stay     You were admitted on:  February 5, 2017 [facility]    You were discharged on:  February 5, 2017       Who to Call     For medical emergencies, please call 911.  For non-urgent questions about your medical care, please call your primary care provider or clinic, [phone]          Attending Provider     Provider    Bette Leary MD Mahoney, Catalina Edouard MD       Primary Care Provider Office Phone # Fax #    Farideh BEBA Cabello [phone]        [facility] [address] [facility] [address]        Your next 10 appointments already scheduled     Feb 08, 2017  1:15 PM   Ech Fetal Follow-Up* with Urmfmusfet, RVPUSR5   [facility] Echo/EKG ([facility])    [address] [facility] [address]               Feb 08, 2017  2:15 PM   MFM US COMPRE SINGLE F/U with URMFMUSR1   [facility])    [address]  [facility] [id number] [phone]           Wear comfortable clothes and leave your valuables at home.            Feb 08, 2017  2:45 PM   Radiology MD with UR ELEAZAR ROMANO   [facility])    [address]  [facility] [address] [phone]           Please arrive at the time given for  your first appointment.  This visit is used internally to schedule the physician's time during your ultrasound.            Feb 13, 2017 11:45 AM   MFM BPP SINGLE with URMFMUSR3   [facility] [place] ([facility])    [address]  [facility] [address]   [phone]            Feb 13, 2017 12:15 PM   Radiology MD with LUANNE BUSH MD   [facility])    [address]  [facility] [address]   [phone]           Please arrive at the time given for your first appointment.  This visit is used internally to schedule the physician's time during your ultrasound.            Feb 16, 2017 11:45 AM   MFM BPP SINGLE with URMFMUSR3   [facility])    [address]  [facility] [address]   [phone]            Feb 16, 2017 12:15 PM   Radiology MD with LUANNE BUSH MD   [facility])    [address]  [facility] [address]   [phone]           Please arrive at the time given for your first appointment.  This visit is used internally to schedule the physician's time during your ultrasound.              Further instructions from your care team       Discharge Instruction for Undelivered Patients      You were seen for: abdominal pain  We Consulted: Dr. Leary and seen by Dr. Hall  You had (Test or Medicine):baby monitoring and labs.     Diet:   To manager your diabetes, follow the guidelines for eating and drinking given to you by your Clinic Provider or Diabetes Educator.       Activity:  Count fetal kicks everyday (see handout)  Call your doctor or nurse midwife if your baby is moving less than usual.     Call your provider if you notice:  Swelling in your face or increased  "swelling in your hands or legs.  Headaches that are not relieved by Tylenol (acetaminophen).  Changes in your vision (blurring: seeing spots or stars.)  Nausea (sick to your stomach) and vomiting (throwing up).   Weight gain of 5 pounds or more per week.  Heartburn that doesn't go away.  Signs of bladder infection: pain when you urinate (use the toilet), need to go more often and more urgently.  The bag of tobin (rupture of membranes) breaks, or you notice leaking in your underwear.  Bright red blood in your underwear.  Abdominal (lower belly) or stomach pain.  For first baby: Contractions (tightening) less than 5 minutes apart for one hour or more.  Second (plus) baby: Contractions (tightening) less than 10 minutes apart and getting stronger.  *If less than 34 weeks: Contractions (tightenings) more than 6 times in one hour.  Increase or change in vaginal discharge (note the color and amount)      Follow-up:  As scheduled in the clinic          Pending Results     No orders found from 2/4/2017 to 2/6/2017.            Statement of Approval     Ordered          02/05/17 1659  I have reviewed and agree with all the recommendations and orders detailed in this document.   EFFECTIVE NOW     Approved and electronically signed by:  Marry Hall MD             Admission Information        Provider Department Dept Phone    2/5/2017 Catalina Chery MD [facility] [phone]      Your Vitals Were     Blood Pressure Pulse Temperature    126/76 mmHg 79 97.6  F (36.4  C) (Oral)    Respirations Height Weight    20 1.626 m (5' 4\") 75.751 kg (167 lb)    BMI (Body Mass Index)          28.65 kg/m2        MyChart Information     [employer] lets you send messages to your doctor, view your test results, renew your prescriptions, schedule appointments and more. To sign up, go to [email or web address] . Click on \"Log in\" on the left side of the screen, which will take you to the Welcome page. Then click on \"Sign up Now\" " on the right side of the page.     You will be asked to enter the access code listed below, as well as some personal information. Please follow the directions to create your username and password.     Your access code is: [email or web address]  Expires: 3/11/2017  2:23 PM     Your access code will  in 90 days. If you need help or a new code, please call your [place] clinic or [phone].        Care EveryWhere ID     This is your Care EveryWhere ID. This could be used by other organizations to access your [place] medical records  [id number]           Review of your medicines      UNREVIEWED medicines. Ask your doctor about these medicines        Dose / Directions    acetaminophen 325 MG tablet   Commonly known as:  TYLENOL   Used for:  Withdrawal from opioids (H)        Dose:  650 mg   Take 2 tablets (650 mg) by mouth every 4 hours as needed for mild pain   Quantity:  100 tablet   Refills:  0       buprenorphine HCl-naloxone HCl 8-2 MG per film   Commonly known as:  SUBOXONE        Dose:  1 Film   Place 1 Film under the tongue daily   Refills:  0       LEVEMIR FLEXPEN/FLEXTOUCH 100 UNIT/ML injection   Indication:  Type 2 Diabetes   Generic drug:  insulin detemir        Dose:  70 Units   Inject 70 Units Subcutaneous every morning   Refills:  0       NovoLOG FLEXPEN 100 UNIT/ML injection   Indication:  Type 2 Diabetes   Generic drug:  insulin aspart        Inject Subcutaneous 3 times daily (with meals)   Refills:  0       ranitidine 150 MG tablet   Commonly known as:  ZANTAC   Used for:  Pregnant state, incidental        Dose:  150 mg   Take 1 tablet (150 mg) by mouth 2 times daily   Quantity:  60 tablet   Refills:  1       sennosides 8.6 MG tablet   Commonly known as:  SENOKOT   Used for:  Withdrawal complaint        Dose:  1 tablet   Take 1 tablet by mouth 2 times daily   Quantity:  60 tablet   Refills:  1       sertraline 50 MG tablet   Commonly known as:  ZOLOFT   Used for:  Withdrawal from opioids (H)         Dose:  50 mg   Take 1 tablet (50 mg) by mouth daily   Quantity:  60 tablet   Refills:  0         CONTINUE these medicines which have NOT CHANGED        Dose / Directions    blood glucose monitoring lancets   Used for:  Type 2 diabetes mellitus with complication, unspecified long term insulin use status (H)        Use to test blood sugar 4 times daily or as directed.   Quantity:  1 Box   Refills:  0       blood glucose monitoring meter device kit   Used for:  Type 2 diabetes mellitus with complication, unspecified long term insulin use status (H)        Use to test blood sugar 4 times daily or as directed.   Quantity:  1 kit   Refills:  0                Protect others around you: Learn how to safely use, store and throw away your medicines at www.disposemymeds.org.             Medication List: This is a list of all your medications and when to take them. Check marks below indicate your daily home schedule. Keep this list as a reference.      Medications           Morning Afternoon Evening Bedtime As Needed    acetaminophen 325 MG tablet   Commonly known as:  TYLENOL   Take 2 tablets (650 mg) by mouth every 4 hours as needed for mild pain   Last time this was given:  500 mg on 2/5/2017 12:33 PM                                blood glucose monitoring lancets   Use to test blood sugar 4 times daily or as directed.                                blood glucose monitoring meter device kit   Use to test blood sugar 4 times daily or as directed.                                buprenorphine HCl-naloxone HCl 8-2 MG per film   Commonly known as:  SUBOXONE   Place 1 Film under the tongue daily                                LEVEMIR FLEXPEN/FLEXTOUCH 100 UNIT/ML injection   Inject 70 Units Subcutaneous every morning   Generic drug:  insulin detemir                                NovoLOG FLEXPEN 100 UNIT/ML injection   Inject Subcutaneous 3 times daily (with meals)   Generic drug:  insulin aspart                                 ranitidine 150 MG tablet   Commonly known as:  ZANTAC   Take 1 tablet (150 mg) by mouth 2 times daily                                sennosides 8.6 MG tablet   Commonly known as:  SENOKOT   Take 1 tablet by mouth 2 times daily                                sertraline 50 MG tablet   Commonly known as:  ZOLOFT   Take 1 tablet (50 mg) by mouth daily                                          More Information        Kick Counts  It s normal to worry about your baby s health. One way you can know your baby s doing well is to record the baby s movements once a day. This is called a kick count. You will usually feel your baby move by the 20th week of pregnancy. Remember to take your kick count records to all your appointments with your healthcare provider.       How to Count Kicks    Choose a time when the baby is active, such as after a meal.     Sit comfortably or lie on your side.     The first time the baby moves, write down the time.     Count each movement until the baby has moved 10 times. This can take from 20 minutes to 2 hours.     If the baby hasn t moved 4 times in 1 hour, pat your stomach to wake the baby up.    Write down the time you feel the baby s 10th movement.    Try to do it at the same time each day.  When to Call Your Healthcare Provider  Call your healthcare provider right away if you notice any of the following:    Your baby moves fewer than 10 times in 2 hours while you re doing kick counts.    Your baby moves much less often than on the days before.    You have not felt your baby move all day.    1998-[address]85 [address], [place] [address]. All rights reserved. This information is not intended as a substitute for professional medical care. Always follow your healthcare professional's instructions.

## 2017-02-05 NOTE — PLAN OF CARE
Patient discharged home with instructions for  labor, signs of preeclampsia and counting baby movements. Patient verbalized understanding.  Plan to follow up tomorrow for regularly scheduled appointment.

## 2017-02-05 NOTE — IP AVS SNAPSHOT
[facility]    [address] [facility] [facility] [place] [address]    Phone:  [phone]                                       After Visit Summary   2/5/2017    Cindy Fisher    MRN: [id number]           After Visit Summary Signature Page     I have received my discharge instructions, and my questions have been answered. I have discussed any challenges I see with this plan with the nurse or doctor.    ..........................................................................................................................................  Patient/Patient Representative Signature      ..........................................................................................................................................  Patient Representative Print Name and Relationship to Patient    ..................................................               ................................................  Date                                            Time    ..........................................................................................................................................  Reviewed by Signature/Title    ...................................................              ..............................................  Date                                                            Time

## 2017-02-06 DIAGNOSIS — O35.13X0 FETAL TRISOMY 21 AFFECTING CARE OF MOTHER, ANTEPARTUM, NOT APPLICABLE OR UNSPECIFIED FETUS: Primary | ICD-10-CM

## 2017-02-08 ENCOUNTER — HOSPITAL ENCOUNTER (OUTPATIENT)
Dept: CARDIOLOGY | Facility: CLINIC | Age: 42
Discharge: HOME | End: 2017-02-08
Attending: OBSTETRICS & GYNECOLOGY | Admitting: OBSTETRICS & GYNECOLOGY
Payer: COMMERCIAL

## 2017-02-08 ENCOUNTER — HOSPITAL ENCOUNTER (OUTPATIENT)
Dept: ULTRASOUND IMAGING | Facility: CLINIC | Age: 42
End: 2017-02-08
Attending: OBSTETRICS & GYNECOLOGY
Payer: COMMERCIAL

## 2017-02-08 ENCOUNTER — OFFICE VISIT (OUTPATIENT)
Dept: MATERNAL FETAL MEDICINE | Facility: CLINIC | Age: 42
End: 2017-02-08
Attending: OBSTETRICS & GYNECOLOGY
Payer: COMMERCIAL

## 2017-02-08 DIAGNOSIS — O36.5930 POOR FETAL GROWTH AFFECTING MANAGEMENT OF MOTHER IN THIRD TRIMESTER, NOT APPLICABLE OR UNSPECIFIED FETUS: Primary | ICD-10-CM

## 2017-02-08 DIAGNOSIS — O24.119 TYPE 2 DIABETES MELLITUS AFFECTING PREGNANCY, ANTEPARTUM: ICD-10-CM

## 2017-02-08 DIAGNOSIS — O35.9XX0 SUSPECTED FETAL ANOMALY, ANTEPARTUM, NOT APPLICABLE OR UNSPECIFIED FETUS: ICD-10-CM

## 2017-02-08 DIAGNOSIS — O35.10X1 SUSPECTED FETAL CHROMOSOME ANOMALY AFFECTING ANTEPARTUM CARE OF MOTHER, FETUS 1: ICD-10-CM

## 2017-02-08 PROCEDURE — 76816 OB US FOLLOW-UP PER FETUS: CPT

## 2017-02-08 PROCEDURE — 76819 FETAL BIOPHYS PROFIL W/O NST: CPT | Performed by: OBSTETRICS & GYNECOLOGY

## 2017-02-08 PROCEDURE — 76826 ECHO EXAM OF FETAL HEART: CPT

## 2017-02-08 NOTE — PROGRESS NOTES
"Please see \"Imaging\" tab under \"Chart Review\" for details of today's US.    Landon Goldberg    "

## 2017-02-10 ENCOUNTER — OFFICE VISIT (OUTPATIENT)
Dept: MATERNAL FETAL MEDICINE | Facility: CLINIC | Age: 42
End: 2017-02-10
Attending: OBSTETRICS & GYNECOLOGY
Payer: COMMERCIAL

## 2017-02-10 ENCOUNTER — HOSPITAL ENCOUNTER (OUTPATIENT)
Dept: ULTRASOUND IMAGING | Facility: CLINIC | Age: 42
Discharge: HOME | End: 2017-02-10
Attending: OBSTETRICS & GYNECOLOGY | Admitting: OBSTETRICS & GYNECOLOGY
Payer: COMMERCIAL

## 2017-02-10 DIAGNOSIS — O09.522 AMA (ADVANCED MATERNAL AGE) MULTIGRAVIDA 35+, SECOND TRIMESTER: ICD-10-CM

## 2017-02-10 DIAGNOSIS — O36.5930 INTRAUTERINE GROWTH RESTRICTION (IUGR) AFFECTING CARE OF MOTHER, THIRD TRIMESTER, SINGLE GESTATION: ICD-10-CM

## 2017-02-10 DIAGNOSIS — O36.5930 POOR FETAL GROWTH AFFECTING MANAGEMENT OF MOTHER IN THIRD TRIMESTER, NOT APPLICABLE OR UNSPECIFIED FETUS: ICD-10-CM

## 2017-02-10 DIAGNOSIS — O24.119 TYPE 2 DIABETES MELLITUS AFFECTING PREGNANCY, ANTEPARTUM: Primary | ICD-10-CM

## 2017-02-10 DIAGNOSIS — O35.13X0 FETAL TRISOMY 21 AFFECTING CARE OF MOTHER, ANTEPARTUM, NOT APPLICABLE OR UNSPECIFIED FETUS: ICD-10-CM

## 2017-02-10 PROCEDURE — 76821 MIDDLE CEREBRAL ARTERY ECHO: CPT

## 2017-02-10 PROCEDURE — 76819 FETAL BIOPHYS PROFIL W/O NST: CPT

## 2017-02-10 PROCEDURE — 76820 UMBILICAL ARTERY ECHO: CPT

## 2017-02-13 ENCOUNTER — HOSPITAL ENCOUNTER (OUTPATIENT)
Facility: CLINIC | Age: 42
Discharge: HOME | End: 2017-02-13
Attending: OBSTETRICS & GYNECOLOGY | Admitting: OBSTETRICS & GYNECOLOGY
Payer: COMMERCIAL

## 2017-02-13 ENCOUNTER — HOSPITAL ENCOUNTER (OUTPATIENT)
Dept: ULTRASOUND IMAGING | Facility: CLINIC | Age: 42
Discharge: HOME | End: 2017-02-13
Attending: OBSTETRICS & GYNECOLOGY | Admitting: OBSTETRICS & GYNECOLOGY
Payer: COMMERCIAL

## 2017-02-13 ENCOUNTER — OFFICE VISIT (OUTPATIENT)
Dept: MATERNAL FETAL MEDICINE | Facility: CLINIC | Age: 42
End: 2017-02-13
Attending: OBSTETRICS & GYNECOLOGY
Payer: COMMERCIAL

## 2017-02-13 VITALS
RESPIRATION RATE: 20 BRPM | WEIGHT: 171 LBS | SYSTOLIC BLOOD PRESSURE: 129 MMHG | BODY MASS INDEX: 29.19 KG/M2 | HEART RATE: 76 BPM | TEMPERATURE: 98.2 F | HEIGHT: 64 IN | DIASTOLIC BLOOD PRESSURE: 85 MMHG

## 2017-02-13 DIAGNOSIS — O24.119 TYPE 2 DIABETES MELLITUS AFFECTING PREGNANCY, ANTEPARTUM: ICD-10-CM

## 2017-02-13 DIAGNOSIS — O35.13X0 FETAL TRISOMY 21 AFFECTING CARE OF MOTHER, ANTEPARTUM, NOT APPLICABLE OR UNSPECIFIED FETUS: Primary | ICD-10-CM

## 2017-02-13 LAB
ALBUMIN UR-MCNC: NEGATIVE MG/DL
AMPHETAMINES UR QL SCN: NORMAL
APPEARANCE UR: ABNORMAL
BACTERIA #/AREA URNS HPF: ABNORMAL /HPF
BILIRUB UR QL STRIP: NEGATIVE
CANNABINOIDS UR QL: NORMAL
COCAINE UR QL: NORMAL
COLOR UR AUTO: YELLOW
GLUCOSE BLDC GLUCOMTR-MCNC: 91 MG/DL (ref 70–99)
GLUCOSE UR STRIP-MCNC: NEGATIVE MG/DL
HGB UR QL STRIP: NEGATIVE
KETONES UR STRIP-MCNC: NEGATIVE MG/DL
LEUKOCYTE ESTERASE UR QL STRIP: ABNORMAL
MUCOUS THREADS #/AREA URNS LPF: PRESENT /LPF
NITRATE UR QL: NEGATIVE
OPIATES UR QL SCN: NORMAL
PCP UR QL SCN: NORMAL
PH UR STRIP: 6.5 PH (ref 5–7)
RBC #/AREA URNS AUTO: 1 /HPF (ref 0–2)
SP GR UR STRIP: 1.01 (ref 1–1.03)
SQUAMOUS #/AREA URNS AUTO: 11 /HPF (ref 0–1)
URN SPEC COLLECT METH UR: ABNORMAL
UROBILINOGEN UR STRIP-MCNC: 2 MG/DL (ref 0–2)
WBC #/AREA URNS AUTO: 31 /HPF (ref 0–2)

## 2017-02-13 PROCEDURE — 76820 UMBILICAL ARTERY ECHO: CPT | Performed by: OBSTETRICS & GYNECOLOGY

## 2017-02-13 PROCEDURE — 80307 DRUG TEST PRSMV CHEM ANLYZR: CPT | Performed by: OBSTETRICS & GYNECOLOGY

## 2017-02-13 PROCEDURE — 76819 FETAL BIOPHYS PROFIL W/O NST: CPT

## 2017-02-13 PROCEDURE — 99214 OFFICE O/P EST MOD 30 MIN: CPT | Mod: 25

## 2017-02-13 PROCEDURE — 81001 URINALYSIS AUTO W/SCOPE: CPT | Performed by: OBSTETRICS & GYNECOLOGY

## 2017-02-13 PROCEDURE — 59025 FETAL NON-STRESS TEST: CPT

## 2017-02-13 PROCEDURE — 82962 GLUCOSE BLOOD TEST: CPT

## 2017-02-13 PROCEDURE — 87086 URINE CULTURE/COLONY COUNT: CPT | Performed by: OBSTETRICS & GYNECOLOGY

## 2017-02-13 NOTE — IP AVS SNAPSHOT
[facility]    [address] [place] [address]    Phone:  [phone]                                       After Visit Summary   2/13/2017    Cindy Fisher    MRN: [id number]           After Visit Summary Signature Page     I have received my discharge instructions, and my questions have been answered. I have discussed any challenges I see with this plan with the nurse or doctor.    ..........................................................................................................................................  Patient/Patient Representative Signature      ..........................................................................................................................................  Patient Representative Print Name and Relationship to Patient    ..................................................               ................................................  Date                                            Time    ..........................................................................................................................................  Reviewed by Signature/Title    ...................................................              ..............................................  Date                                                            Time

## 2017-02-13 NOTE — IP AVS SNAPSHOT
MRN:[id number]                      After Visit Summary   2/13/2017    Cindy Fisher    MRN: [id number]           Thank you!     Thank you for choosing [facility] for your care. Our goal is always to provide you with excellent care. Hearing back from our patients is one way we can continue to improve our services. Please take a few minutes to complete the written survey that you may receive in the mail after you visit with us. Thank you!        Patient Information     Date Of Birth          1975        About your hospital stay     You were admitted on:  February 13, 2017 You [facility] the:  [facility]    You were discharged on:  February 13, 2017       Who to Call     For medical emergencies, please call 911.  For non-urgent questions about your medical care, please call your primary care provider or clinic, [phone]          Attending Provider     Provider Specialty    Kim Hilliard MD OB/Gyn       Primary Care Provider Office Phone # Fax #    Farideh BEBA Cabello [phone]        [facility] [address] [facility] [address]        Your next 10 appointments already scheduled     Feb 16, 2017 11:45 AM CST   ELEAZAR GONZALEZ SINGLE with [email or web address]   [facility])    [address]  [facility] [address]   [phone]            Feb 16, 2017 12:15 PM CST   Radiology MD with UR ELEAZAR ROMANO   [facility])    [address]  [facility] [address]   [phone]           Please arrive at the time given for your first appointment.  This visit is used internally to schedule the physician's time during your ultrasound.            Feb 21, 2017  3:30 PM SMITH GONZALEZ SINGLE with URMFMUSR2   [facility]  [facility])    [address]  [facility] [address]   [phone]            Feb 21, 2017  4:00 PM SMITH   Radiology MD with UR ELEAZAR ROMANO   [facility]etal [facility] - [facility])    [address]  [facility] [address]   [phone]           Please arrive at the time given for your first appointment.  This visit is used internally to schedule the physician's time during your ultrasound.            Feb 24, 2017  8:00 AM SMITH BUSH US COMPRE SINGLE F/U with [email or web address]   [facility] Ultrasound - [facility])    [address]  [facility] [address]   [phone]           Wear comfortable clothes and leave your valuables at home.            Feb 24, 2017  8:30 AM SMITH   Radiology MD with LUANNE BUSH MD   [facility] - [facility])    [address]  [facility] [address]   [phone]           Please arrive at the time given for your first appointment.  This visit is used internally to schedule the physician's time during your ultrasound.              Further instructions from your care team       Discharge Instruction for Undelivered Patients      You were seen for: Labor Assessment  We Consulted: Dr Hilliard  You had (Test or Medicine):NST    Diet:   Regular         Call your provider if you notice:  Swelling in your face or increased swelling in your hands or legs.  Headaches that are not relieved by Tylenol (acetaminophen).  Changes in your vision (blurring: seeing spots or stars.)  Nausea (sick to your stomach) and vomiting (throwing up).   Weight gain of 5 pounds or more per week.  Heartburn that doesn't go away.  Signs of bladder infection: pain when you urinate (use the toilet), need to go more often and more urgently.  The bag of tobin (rupture of membranes) breaks, or you notice leaking in your  "underwear.  Bright red blood in your underwear.  Abdominal (lower belly) or stomach pain.  For first baby: Contractions (tightening) less than 5 minutes apart for one hour or more.  Second (plus) baby: Contractions (tightening) less than 10 minutes apart and getting stronger.  *If less than 34 weeks: Contractions (tightenings) more than 6 times in one hour.  Increase or change in vaginal discharge (note the color and amount)    Follow-up:  As scheduled in the clinic          Pending Results     No orders found from 2017 to 2017.            Statement of Approval     Ordered          17 1616  I have reviewed and agree with all the recommendations and orders detailed in this document.  EFFECTIVE NOW     Approved and electronically signed by:  Kim Hilliard MD             Admission Information     Date & Time Provider Department Dept. Phone    2017 Kim Hilliard MD UR [facility] [phone]      Your Vitals Were     Blood Pressure Pulse Temperature Respirations Height Weight    129/85 76 97.8  F (36.6  C) (Oral) 20 1.626 m (5' 4\") 77.6 kg (171 lb)    BMI (Body Mass Index)                   29.35 kg/m2           MyChart Information     [employer] lets you send messages to your doctor, view your test results, renew your prescriptions, schedule appointments and more. To sign up, go to [email or web address] . Click on \"Log in\" on the left side of the screen, which will take you to the Welcome page. Then click on \"Sign up Now\" on the right side of the page.     You will be asked to enter the access code listed below, as well as some personal information. Please follow the directions to create your username and password.     Your access code is: [email or web address]  Expires: 3/11/2017  2:23 PM     Your access code will  in 90 days. If you need help or a new code, please call your [facility] clinic or [phone].        Care EveryWhere ID     This is your Care EveryWhere ID. This could be used " by other organizations to access your [facility] medical records  [id number]           Review of your medicines      UNREVIEWED medicines. Ask your doctor about these medicines        Dose / Directions    acetaminophen 325 MG tablet   Commonly known as:  TYLENOL   Used for:  Withdrawal from opioids (H)        Dose:  650 mg   Take 2 tablets (650 mg) by mouth every 4 hours as needed for mild pain   Quantity:  100 tablet   Refills:  0       buprenorphine HCl-naloxone HCl 8-2 MG per film   Commonly known as:  SUBOXONE        Dose:  1 Film   Place 1 Film under the tongue 2 times daily   Refills:  0       LEVEMIR FLEXPEN/FLEXTOUCH 100 UNIT/ML injection   Indication:  Type 2 Diabetes   Generic drug:  insulin detemir        Dose:  30 Units   Inject 30 Units Subcutaneous every morning   Refills:  0       NovoLOG FLEXPEN 100 UNIT/ML injection   Indication:  Type 2 Diabetes   Generic drug:  insulin aspart        Inject Subcutaneous 3 times daily (with meals)   Refills:  0       ranitidine 150 MG tablet   Commonly known as:  ZANTAC   Used for:  Pregnant state, incidental        Dose:  150 mg   Take 1 tablet (150 mg) by mouth 2 times daily   Quantity:  60 tablet   Refills:  1       sennosides 8.6 MG tablet   Commonly known as:  SENOKOT   Used for:  Withdrawal complaint        Dose:  1 tablet   Take 1 tablet by mouth 2 times daily   Quantity:  60 tablet   Refills:  1       sertraline 50 MG tablet   Commonly known as:  ZOLOFT   Used for:  Withdrawal from opioids (H)        Dose:  50 mg   Take 1 tablet (50 mg) by mouth daily   Quantity:  60 tablet   Refills:  0         CONTINUE these medicines which have NOT CHANGED        Dose / Directions    blood glucose monitoring lancets   Used for:  Type 2 diabetes mellitus with complication, unspecified long term insulin use status (H)        Use to test blood sugar 4 times daily or as directed.   Quantity:  1 Box   Refills:  0       blood glucose monitoring meter device kit   Used for:   Type 2 diabetes mellitus with complication, unspecified long term insulin use status (H)        Use to test blood sugar 4 times daily or as directed.   Quantity:  1 kit   Refills:  0                Protect others around you: Learn how to safely use, store and throw away your medicines at www.disposemymeds.org.             Medication List: This is a list of all your medications and when to take them. Check marks below indicate your daily home schedule. Keep this list as a reference.      Medications           Morning Afternoon Evening Bedtime As Needed    acetaminophen 325 MG tablet   Commonly known as:  TYLENOL   Take 2 tablets (650 mg) by mouth every 4 hours as needed for mild pain                                blood glucose monitoring lancets   Use to test blood sugar 4 times daily or as directed.                                blood glucose monitoring meter device kit   Use to test blood sugar 4 times daily or as directed.                                buprenorphine HCl-naloxone HCl 8-2 MG per film   Commonly known as:  SUBOXONE   Place 1 Film under the tongue 2 times daily                                LEVEMIR FLEXPEN/FLEXTOUCH 100 UNIT/ML injection   Inject 30 Units Subcutaneous every morning   Generic drug:  insulin detemir                                NovoLOG FLEXPEN 100 UNIT/ML injection   Inject Subcutaneous 3 times daily (with meals)   Generic drug:  insulin aspart                                ranitidine 150 MG tablet   Commonly known as:  ZANTAC   Take 1 tablet (150 mg) by mouth 2 times daily                                sennosides 8.6 MG tablet   Commonly known as:  SENOKOT   Take 1 tablet by mouth 2 times daily                                sertraline 50 MG tablet   Commonly known as:  ZOLOFT   Take 1 tablet (50 mg) by mouth daily

## 2017-02-13 NOTE — PROGRESS NOTES
"Please see \"Imaging\" tab under \"Chart Review\" for details of today's US.    Yolette Boland, DO    "

## 2017-02-13 NOTE — MR AVS SNAPSHOT
After Visit Summary   2/13/2017    Cindy Fisher    MRN: [id number]           Patient Information     Date Of Birth          1975        Visit Information        Provider Department      2/13/2017 12:15 PM Yolette Boland DO [facility]etal Medicine - [place]        Today's Diagnoses     Fetal trisomy 21 affecting care of mother, antepartum, not applicable or unspecified fetus    -  1       Follow-ups after your visit        Your next 10 appointments already scheduled     Feb 16, 2017 11:45 AM CST   ELEAZAR BPP SINGLE with URMFMUSR3   [facility])    [address]  [facility] [address]   [phone]            Feb 16, 2017 12:15 PM CST   Radiology MD with LUANNE BUSH MD   [facility])    [address]  [facility] [address]   [phone]           Please arrive at the time given for your first appointment.  This visit is used internally to schedule the physician's time during your ultrasound.            Feb 21, 2017  3:30 PM SMITH BUSH BPP SINGLE with URMFMUSR2   [facility] [place] ([facility])    [address]  [facility] [address]   [phone]            Feb 21, 2017  4:00 PM CST   Radiology MD with LUANNE BUSH MD   [facility] [place] ([facility])    [address]  [facility] [address]   [phone]           Please arrive at the time given for your first appointment.  This visit is used internally to schedule the physician's time during your ultrasound.            Feb 24, 2017  8:00 AM CST   ELEAZAR US COMPRE SINGLE F/U with [email or web address]   [facility] [place] ([facility])     "[address]  [facility] [address]   [phone]           Wear comfortable clothes and leave your valuables at home.            2017  8:30 AM CST   Radiology MD with UR ELEAZAR ROMANO   [facility] M[facility] Fetal Medicine [facility] ([facility])    [address]  [facility] [address]   [phone]           Please arrive at the time given for your first appointment.  This visit is used internally to schedule the physician's time during your ultrasound.              Who to contact     If you have questions or need follow up information about today's clinic visit or your schedule please contact [facility] directly at [phone].  Normal or non-critical lab and imaging results will be communicated to you by [employer], letter or phone within 4 business days after the clinic has received the results. If you do not hear from us within 7 days, please contact the clinic through [employer] or phone. If you have a critical or abnormal lab result, we will notify you by phone as soon as possible.  Submit refill requests through [employer] or call your pharmacy and they will forward the refill request to us. Please allow 3 business days for your refill to be completed.          Additional Information About Your Visit        [employer] Information     [employer] lets you send messages to your doctor, view your test results, renew your prescriptions, schedule appointments and more. To sign up, go to [email or web address] . Click on \"Log in\" on the left side of the screen, which will take you to the Welcome page. Then click on \"Sign up Now\" on the right side of the page.     You will be asked to enter the access code listed below, as well as some personal information. Please follow the directions to create your username and password.     Your access code is: [email or web address]  Expires: 3/11/2017  2:23 PM     Your access code will  in 90 days. If you need help or " a new code, please call your [place] clinic or [phone].        Care EveryWhere ID     This is your Care EveryWhere ID. This could be used by other organizations to access your [facility] [place] medical records  [id number]         Blood Pressure from Last 3 Encounters:   02/05/17 126/76   01/19/17 112/57   12/30/16 95/62    Weight from Last 3 Encounters:   02/05/17 75.8 kg (167 lb)   10/17/07 88.7 kg (195 lb 9.6 oz)   10/04/07 86.2 kg (190 lb)              Today, you had the following     No orders found for display       Primary Care Provider Office Phone # Fax #    Farideh BEBA Cabello [phone]       [facility] [address] [facility] [address]        Thank you!     Thank you for choosing [facility]  for your care. Our goal is always to provide you with excellent care. Hearing back from our patients is one way we can continue to improve our services. Please take a few minutes to complete the written survey that you may receive in the mail after your visit with us. Thank you!             Your Updated Medication List - Protect others around you: Learn how to safely use, store and throw away your medicines at www.disposemymeds.org.          This list is accurate as of: 2/13/17 12:57 PM.  Always use your most recent med list.                   Brand Name Dispense Instructions for use    acetaminophen 325 MG tablet    TYLENOL    100 tablet    Take 2 tablets (650 mg) by mouth every 4 hours as needed for mild pain       blood glucose monitoring lancets     1 Box    Use to test blood sugar 4 times daily or as directed.       blood glucose monitoring meter device kit     1 kit    Use to test blood sugar 4 times daily or as directed.       buprenorphine HCl-naloxone HCl 8-2 MG per film    SUBOXONE     Place 1 Film under the tongue daily       LEVEMIR FLEXPEN/FLEXTOUCH 100 UNIT/ML injection   Generic drug:  insulin detemir      Inject 70 Units Subcutaneous  every morning       NovoLOG FLEXPEN 100 UNIT/ML injection   Generic drug:  insulin aspart      Inject Subcutaneous 3 times daily (with meals)       ranitidine 150 MG tablet    ZANTAC    60 tablet    Take 1 tablet (150 mg) by mouth 2 times daily       sennosides 8.6 MG tablet    SENOKOT    60 tablet    Take 1 tablet by mouth 2 times daily       sertraline 50 MG tablet    ZOLOFT    60 tablet    Take 1 tablet (50 mg) by mouth daily

## 2017-02-13 NOTE — DISCHARGE INSTRUCTIONS
Discharge Instruction for Undelivered Patients      You were seen for: Labor Assessment  We Consulted: Dr Hilliard  You had (Test or Medicine):NST    Diet:   Regular         Call your provider if you notice:  Swelling in your face or increased swelling in your hands or legs.  Headaches that are not relieved by Tylenol (acetaminophen).  Changes in your vision (blurring: seeing spots or stars.)  Nausea (sick to your stomach) and vomiting (throwing up).   Weight gain of 5 pounds or more per week.  Heartburn that doesn't go away.  Signs of bladder infection: pain when you urinate (use the toilet), need to go more often and more urgently.  The bag of tobin (rupture of membranes) breaks, or you notice leaking in your underwear.  Bright red blood in your underwear.  Abdominal (lower belly) or stomach pain.  For first baby: Contractions (tightening) less than 5 minutes apart for one hour or more.  Second (plus) baby: Contractions (tightening) less than 10 minutes apart and getting stronger.  *If less than 34 weeks: Contractions (tightenings) more than 6 times in one hour.  Increase or change in vaginal discharge (note the color and amount)    Follow-up:  As scheduled in the clinic

## 2017-02-14 ENCOUNTER — HOSPITAL ENCOUNTER (OUTPATIENT)
Facility: CLINIC | Age: 42
Discharge: HOME | End: 2017-02-14
Attending: OBSTETRICS & GYNECOLOGY | Admitting: OBSTETRICS & GYNECOLOGY
Payer: COMMERCIAL

## 2017-02-14 VITALS
TEMPERATURE: 97.4 F | SYSTOLIC BLOOD PRESSURE: 115 MMHG | RESPIRATION RATE: 18 BRPM | DIASTOLIC BLOOD PRESSURE: 78 MMHG | HEART RATE: 80 BPM

## 2017-02-14 LAB
BACTERIA SPEC CULT: NORMAL
GLUCOSE BLDC GLUCOMTR-MCNC: 67 MG/DL (ref 70–99)
GLUCOSE BLDC GLUCOMTR-MCNC: 70 MG/DL (ref 70–99)
Lab: NORMAL
MICRO REPORT STATUS: NORMAL
SPECIMEN SOURCE: NORMAL

## 2017-02-14 PROCEDURE — 99213 OFFICE O/P EST LOW 20 MIN: CPT

## 2017-02-14 PROCEDURE — 25000125 ZZHC RX 250: Performed by: OBSTETRICS & GYNECOLOGY

## 2017-02-14 PROCEDURE — 25000132 ZZH RX MED GY IP 250 OP 250 PS 637: Performed by: OBSTETRICS & GYNECOLOGY

## 2017-02-14 PROCEDURE — 82962 GLUCOSE BLOOD TEST: CPT

## 2017-02-14 RX ORDER — ACETAMINOPHEN 325 MG/1
650 TABLET ORAL EVERY 4 HOURS PRN
Status: DISCONTINUED | OUTPATIENT
Start: 2017-02-14 | End: 2017-02-14 | Stop reason: HOSPADM

## 2017-02-14 RX ORDER — ONDANSETRON 4 MG/1
4 TABLET, ORALLY DISINTEGRATING ORAL EVERY 6 HOURS PRN
Status: DISCONTINUED | OUTPATIENT
Start: 2017-02-14 | End: 2017-02-14 | Stop reason: HOSPADM

## 2017-02-14 RX ADMIN — ACETAMINOPHEN 650 MG: 325 TABLET, FILM COATED ORAL at 16:05

## 2017-02-14 RX ADMIN — ONDANSETRON 4 MG: 4 TABLET, ORALLY DISINTEGRATING ORAL at 15:59

## 2017-02-14 NOTE — IP AVS SNAPSHOT
MRN:[id number]                      After Visit Summary   2/14/2017    Cindy Fisher    MRN: [id number]           Thank you!     Thank you for choosing [facility] for your care. Our goal is always to provide you with excellent care. Hearing back from our patients is one way we can continue to improve our services. Please take a few minutes to complete the written survey that you may receive in the mail after you visit with us. Thank you!        Patient Information     Date Of Birth          1975        About your hospital stay     You were admitted on:  February 14, 2017 [facility]    You were discharged on:  February 14, 2017        Reason for your hospital stay       Right abdominal pain                  Who to Call     For medical emergencies, please call 911.  For non-urgent questions about your medical care, please call your primary care provider or clinic, [phone]          Attending Provider     Provider Specialty    Catalina Allen MD OB/Gyn       Primary Care Provider Office Phone # Fax #    Farideh Cabello [phone]        [facility] [address] [facility] [address]        After Care Instructions     Activity       Your activity upon discharge: activity as tolerated            Diet       Follow this diet upon discharge: Regular            Discharge Instructions       Return w/regular contractions, leakage of fluid, vaginal bleeding, or decreased fetal movement                  Follow-up Appointments     Adult [facility] Follow-up and recommended labs and tests       Follow up in clinic in 1-2 weeks                  Your next 10 appointments already scheduled     Feb 16, 2017 11:45 AM CST   ELEAZAR BPP SINGLE with [id number]   [facility] [place] ([facility])    [address] [facility] [address]   [phone]            Feb 16,  2017 12:15 PM SMITH   Radiology MD with LUANNE BUSH MD   [facility] [place] ([facility])    [address]  [facility] [address]   [phone]           Please arrive at the time given for your first appointment.  This visit is used internally to schedule the physician's time during your ultrasound.            Feb 21, 2017  3:30 PM SMITH BUSH BPP SINGLE with [id number]   [facility] [place] ([facility])    [address]  [facility] [address]   [phone]            Feb 21, 2017  4:00 PM SMITH   Radiology MD with LUANNE BUSH MD   [facility]    [address]  [facility] [address]   [phone]           Please arrive at the time given for your first appointment.  This visit is used internally to schedule the physician's time during your ultrasound.            Feb 24, 2017  8:00 AM SMITH BUSH US COMPRE SINGLE F/U with [email or web address]   [facility] [place] ([facility])    [address]  [facility] [address]   [phone]           Wear comfortable clothes and leave your valuables at home.            Feb 24, 2017  8:30 AM SMITH   Radiology MD with LUANNE BUSH MD   [facility] [place] ([facility])    [address]  [facility] [address]   [phone]           Please arrive at the time given for your first appointment.  This visit is used internally to schedule the physician's time during your ultrasound.              Further instructions from your care team       Discharge Instructions for Undelivered Patients    Diet:  * Drink 8 to 12 glasses of liquids (milk, juice, water) every day  * You may eat meals and snacks.    Activity:  ** Count fetal kicks every day  "(see handout).  * Call your doctor or nurse midwife if your baby is moving less than usual.    Call your provider if you notice:  * Swelling in your face or increased swelling in your hands or legs.  * Headaches that are not relieved by Tylenol (acetaminophen).  * Changes in your vision (blurring; seeing spots or stars).  * Nausea (sick to your stomach) and vomiting (throwing up).  * Weight gain of 5 pounds per week.  * Heartburn that doesn't go away.  * Signs of bladder infection: Pain when you urinate (use the toilet), needing to go more often or more urgently.  * The bag of tobin (membrane) breaks, or you notice leaking in your underwear.  * Bright red blood in your underwear.  * Abdominal (lower belly) or stomach pain.  * For first baby: Contractions (tightenings) less than 5 minutes apart for one hour or more.  * Second (plus) baby: Contractions (tightenings) less than 10 minutes apart and getting stronger.  * Increase or change in vaginal discharge (note the color and amount).          Pending Results     Date and Time Order Name Status Description    2/13/2017 1700 Urine Culture Aerobic Bacterial Preliminary             Statement of Approval     Ordered          02/14/17 1563  I have reviewed and agree with all the recommendations and orders detailed in this document.  EFFECTIVE NOW     Approved and electronically signed by:  Catalina Allen MD             Admission Information     Date & Time Provider Department Dept. Phone    2/14/2017 Catalina Allen MD [facility] [phone]      Your Vitals Were     Blood Pressure Pulse Temperature Respirations          115/78 80 97.4  F (36.3  C) (Oral) 18        MyChart Information     [email or web address] lets you send messages to your doctor, view your test results, renew your prescriptions, schedule appointments and more. To sign up, go to [email or web address] . Click on \"Log in\" on the left side of the screen, which will take you to the Welcome page. Then click on \"Sign " "up Now\" on the right side of the page.     You will be asked to enter the access code listed below, as well as some personal information. Please follow the directions to create your username and password.     Your access code is: [email or web address]  Expires: 3/11/2017  2:23 PM     Your access code will  in 90 days. If you need help or a new code, please call your [place] clinic or [phone].        Care EveryWhere ID     This is your Care EveryWhere ID. This could be used by other organizations to access your [place] medical records  [id number]           Review of your medicines      CONTINUE these medicines which have NOT CHANGED        Dose / Directions    acetaminophen 325 MG tablet   Commonly known as:  TYLENOL   Used for:  Withdrawal from opioids (H)        Dose:  650 mg   Take 2 tablets (650 mg) by mouth every 4 hours as needed for mild pain   Quantity:  100 tablet   Refills:  0       blood glucose monitoring lancets   Used for:  Type 2 diabetes mellitus with complication, unspecified long term insulin use status (H)        Use to test blood sugar 4 times daily or as directed.   Quantity:  1 Box   Refills:  0       blood glucose monitoring meter device kit   Used for:  Type 2 diabetes mellitus with complication, unspecified long term insulin use status (H)        Use to test blood sugar 4 times daily or as directed.   Quantity:  1 kit   Refills:  0       buprenorphine HCl-naloxone HCl 8-2 MG per film   Commonly known as:  SUBOXONE        Dose:  1 Film   Place 1 Film under the tongue 2 times daily   Refills:  0       LEVEMIR FLEXPEN/FLEXTOUCH 100 UNIT/ML injection   Indication:  Type 2 Diabetes   Generic drug:  insulin detemir        Dose:  30 Units   Inject 30 Units Subcutaneous every morning   Refills:  0       NovoLOG FLEXPEN 100 UNIT/ML injection   Indication:  Type 2 Diabetes   Generic drug:  insulin aspart        Inject Subcutaneous 3 times daily (with meals)   Refills:  0       ranitidine 150 MG " tablet   Commonly known as:  ZANTAC   Used for:  Pregnant state, incidental        Dose:  150 mg   Take 1 tablet (150 mg) by mouth 2 times daily   Quantity:  60 tablet   Refills:  1       sennosides 8.6 MG tablet   Commonly known as:  SENOKOT   Used for:  Withdrawal complaint        Dose:  1 tablet   Take 1 tablet by mouth 2 times daily   Quantity:  60 tablet   Refills:  1       sertraline 50 MG tablet   Commonly known as:  ZOLOFT   Used for:  Withdrawal from opioids (H)        Dose:  50 mg   Take 1 tablet (50 mg) by mouth daily   Quantity:  60 tablet   Refills:  0                Protect others around you: Learn how to safely use, store and throw away your medicines at www.disposemymeds.org.             Medication List: This is a list of all your medications and when to take them. Check marks below indicate your daily home schedule. Keep this list as a reference.      Medications           Morning Afternoon Evening Bedtime As Needed    acetaminophen 325 MG tablet   Commonly known as:  TYLENOL   Take 2 tablets (650 mg) by mouth every 4 hours as needed for mild pain   Last time this was given:  650 mg on 2/14/2017  4:05 PM                                blood glucose monitoring lancets   Use to test blood sugar 4 times daily or as directed.                                blood glucose monitoring meter device kit   Use to test blood sugar 4 times daily or as directed.                                buprenorphine HCl-naloxone HCl 8-2 MG per film   Commonly known as:  SUBOXONE   Place 1 Film under the tongue 2 times daily                                LEVEMIR FLEXPEN/FLEXTOUCH 100 UNIT/ML injection   Inject 30 Units Subcutaneous every morning   Generic drug:  insulin detemir                                NovoLOG FLEXPEN 100 UNIT/ML injection   Inject Subcutaneous 3 times daily (with meals)   Generic drug:  insulin aspart                                ranitidine 150 MG tablet   Commonly known as:  ZANTAC   Take 1  tablet (150 mg) by mouth 2 times daily                                sennosides 8.6 MG tablet   Commonly known as:  SENOKOT   Take 1 tablet by mouth 2 times daily                                sertraline 50 MG tablet   Commonly known as:  ZOLOFT   Take 1 tablet (50 mg) by mouth daily

## 2017-02-14 NOTE — IP AVS SNAPSHOT
[facility]    [address] [place] [address]    Phone:  [phone]                                       After Visit Summary   2/14/2017    Cindy Fisher    MRN: [id number]           After Visit Summary Signature Page     I have received my discharge instructions, and my questions have been answered. I have discussed any challenges I see with this plan with the nurse or doctor.    ..........................................................................................................................................  Patient/Patient Representative Signature      ..........................................................................................................................................  Patient Representative Print Name and Relationship to Patient    ..................................................               ................................................  Date                                            Time    ..........................................................................................................................................  Reviewed by Signature/Title    ...................................................              ..............................................  Date                                                            Time

## 2017-02-14 NOTE — PROGRESS NOTES
[facility]  OB Triage Note    CC: Right sided abdominal pain    HPI: Ms. Cindy Fisher is a 41 year old  at 32w6d by 6w6d US, who presents to triage for evaluation of right sided abdominal pain.  She states there right sided abdominal pain began this afternoon.  She rates her pain as a 9/10 and states it remains constant.  Nothing makes the pain better or worse. In addition she reports three episodes of vomiting this afternoon.  She denies any regular contractions, leakage of fluid, or vaginal bleeding. She confirms active fetal movement.  Confirms taking insulin and subutex.  She was seen in triage yesterday for left sided abdominal pain.  She denies any headache, changes in vision, chest pain, shortness of breath, RUQ pain, or worsening edema.    Obstetric Complications  - Type II Diabetes mellitus  - Fetus with trisomy 21 on NIPT  - Possible fetal VSD  - History of substance abuse, currently on subutex treatment    Objective:  Patient Vitals for the past 24 hrs:   BP Temp Temp src Pulse Resp   17 1440 115/78 97.4  F (36.3  C) Oral 80 18     Gen: Well-appearing, NAD  CV: RRR, no murmurs  Pulm: CTAB, no wheezes  Abd: Soft, gravid, minimal tenderness to palpation  Ext: trace LE edema b/l    Cervix: 1 external os/interal os closed/L/H    FHT: BL 120s, moderate ben, accels present, no decels  [place]: no ctx in 10 mins    Labs:  UDS - negative  UA : large LE, UC pending, preliminary negative  Blood glucose 67    Assesment/Plan:  Ms. Cindy Fisher is a 41 year old  at 32w6d by 6w6d US, who presents to triage for evaluation of right sided abdominal pain.  Her pregnancy is complicated by: TIIDM, fetus w/trisomy 21, fetal VSD, and current subutex use.    - Right sided abdominal pain   - Consistent with normal changes in pregnancy   - SVE: C/L/H. [place]: quiet   - Management with tylenol, heat, and maternity belt with improvement in symptoms.   - Nausea improved with  one dose of zofran. Passed PO challenge.   - Discussed return for regular contractions, leakage of fluid, vaginal bleeding, or decreased fetal movement    - TIIDM   - Blood glucose 67. Supplemented with juice and peanut butter toast   - Continue close outpatient follow-up.    -Fetal Well Being   - Category I FHT. Reactive and reassuring    - Staffed with Alejandro  - Return in next 1-2 weeks for routine prenatal care.      Alyce Pimentel MD  OB/GYN Resident, PGY-2  2/14/2017 7:26 PM      Appreciate Dr. Pimentel's note above, patient's history and physical exam findings reviewed by me. I agree with the note above.   Catalina Allen MD

## 2017-02-14 NOTE — PLAN OF CARE
"Pt to triage for evaluation of right side pain, contractions q5 and N&V since approx 1330. Active baby, no LOF or bleeding. Occas ctx per toco, abd soft to palp, FHR reactive. Ate approx 1300 (pickles and ________________) which came up shortly thereafter. No diarrhea, constipation. Pt moaning, states \"owee, owee, owee\", rubbing right side. Reports N&V freq and \"normal\" during her pregnancies and passes generally with time rather than meds or other interventions. Awaiting plan of MD for care.  "

## 2017-02-15 NOTE — DISCHARGE INSTRUCTIONS
Discharge Instructions for Undelivered Patients    Diet:  * Drink 8 to 12 glasses of liquids (milk, juice, water) every day  * You may eat meals and snacks.    Activity:  ** Count fetal kicks every day (see handout).  * Call your doctor or nurse midwife if your baby is moving less than usual.    Call your provider if you notice:  * Swelling in your face or increased swelling in your hands or legs.  * Headaches that are not relieved by Tylenol (acetaminophen).  * Changes in your vision (blurring; seeing spots or stars).  * Nausea (sick to your stomach) and vomiting (throwing up).  * Weight gain of 5 pounds per week.  * Heartburn that doesn't go away.  * Signs of bladder infection: Pain when you urinate (use the toilet), needing to go more often or more urgently.  * The bag of tobin (membrane) breaks, or you notice leaking in your underwear.  * Bright red blood in your underwear.  * Abdominal (lower belly) or stomach pain.  * For first baby: Contractions (tightenings) less than 5 minutes apart for one hour or more.  * Second (plus) baby: Contractions (tightenings) less than 10 minutes apart and getting stronger.  * Increase or change in vaginal discharge (note the color and amount).

## 2017-02-16 ENCOUNTER — OFFICE VISIT (OUTPATIENT)
Dept: MATERNAL FETAL MEDICINE | Facility: CLINIC | Age: 42
End: 2017-02-16
Attending: OBSTETRICS & GYNECOLOGY
Payer: COMMERCIAL

## 2017-02-16 ENCOUNTER — HOSPITAL ENCOUNTER (OUTPATIENT)
Dept: ULTRASOUND IMAGING | Facility: CLINIC | Age: 42
Discharge: HOME | End: 2017-02-16
Attending: OBSTETRICS & GYNECOLOGY | Admitting: OBSTETRICS & GYNECOLOGY
Payer: COMMERCIAL

## 2017-02-16 DIAGNOSIS — O24.119 PRE-EXISTING TYPE 2 DIABETES MELLITUS DURING PREGNANCY, ANTEPARTUM: ICD-10-CM

## 2017-02-16 DIAGNOSIS — O35.10X1 SUSPECTED FETAL CHROMOSOME ANOMALY AFFECTING ANTEPARTUM CARE OF MOTHER, FETUS 1: ICD-10-CM

## 2017-02-16 DIAGNOSIS — O24.119 TYPE 2 DIABETES MELLITUS AFFECTING PREGNANCY, ANTEPARTUM: ICD-10-CM

## 2017-02-16 DIAGNOSIS — O36.5930 INTRAUTERINE GROWTH RESTRICTION (IUGR) AFFECTING CARE OF MOTHER, THIRD TRIMESTER, SINGLE GESTATION: Primary | ICD-10-CM

## 2017-02-16 PROCEDURE — 76819 FETAL BIOPHYS PROFIL W/O NST: CPT

## 2017-02-16 PROCEDURE — 76820 UMBILICAL ARTERY ECHO: CPT

## 2017-02-16 NOTE — PROGRESS NOTES
"Please see \"Imaging\" tab under \"Chart Review\" for details of today's US at the [facility].    Teodoro Ace MD  Maternal-Fetal Medicine      "

## 2017-02-16 NOTE — MR AVS SNAPSHOT
After Visit Summary   2/16/2017    Cindy Fisher    MRN: [id number]           Patient Information     Date Of Birth          1975        Visit Information        Provider Department      2/16/2017 12:15 PM Teodoro Ace MD [facility] Maternal Fetal Medicine - [place]        Today's Diagnoses     Intrauterine growth restriction (IUGR) affecting care of mother, third trimester, single gestation    -  1    Pre-existing type 2 diabetes mellitus during pregnancy, antepartum        Suspected fetal chromosome anomaly affecting antepartum care of mother, fetus 1           Follow-ups after your visit        Your next 10 appointments already scheduled     Feb 21, 2017  3:30 PM CST   ELEAZAR BPP SINGLE with URMFMUSR2   [facility] Maternal Fetal Medicine Ultrasound - [facility])    [address]  [facility] [address] [phone]            Feb 21, 2017  4:00 PM CST   Radiology MD with LUANNE BUSH MD   [facility] Maternal Fetal Medicine - [facility])    [address]  [facility] [address]   [phone]           Please arrive at the time given for your first appointment.  This visit is used internally to schedule the physician's time during your ultrasound.            Feb 24, 2017  8:00 AM SMITH BUSH US COMPRE SINGLE F/U with URMFMUSR3   [facility]al Maternal Fetal Medicine Ultrasound - [facility])    [address]  [facility] [address] [phone]           Wear comfortable clothes and leave your valuables at home.            Feb 24, 2017  8:30 AM SMITH   Radiology MD with LUANNE BUSH MD   [facility] Maternal Fetal Medicine - [facility])    [address]  [facility] [address]   [phone]           Please arrive at the time given for your first appointment.  This visit is used internally to schedule  the physician's time during your ultrasound.            Feb 28, 2017  3:00 PM CST   MFM BPP SINGLE with URMFMUSR2   [facility] - [place] ([facility])    [address]  [facility] [address]   [phone]            Feb 28, 2017  3:30 PM CST   Radiology MD with LUANNE BUSH MD   [facility] - [place] ([facility])    [address]  [facility] [place] [address]   [phone]           Please arrive at the time given for your first appointment.  This visit is used internally to schedule the physician's time during your ultrasound.            Mar 03, 2017  3:00 PM CST   MFSUNDAY BPP SINGLE with URMFMUSR1   [facility]ound - [place] ([facility])    [address]  [facility] [address]   [phone]            Mar 03, 2017  3:30 PM CST   Radiology MD with LUANNE BUSH MD   [facility] Maternal Fetal Medicine - [place] ([facility])    [address]  [facility] [place] [address]   [phone]           Please arrive at the time given for your first appointment.  This visit is used internally to schedule the physician's time during your ultrasound.            Mar 07, 2017  1:30 PM CST   MFSUNDAY BPP SINGLE with URMFMUSR2   [facility]ound - [place] ([facility])    [address]  [facility] [address]   [phone]            Mar 07, 2017  2:00 PM CST   Radiology MD with LUANNE BUSH MD   [facility] - [place] ([facility])    [address]  [facility] [address]   [phone]           Please arrive at the time given for your first appointment.  This visit is used internally to schedule the physician's time during your ultrasound.              Future tests that were  "ordered for you today     Open Future Orders        Priority Expected Expires Ordered    MFM US Comprehensive Single F/U Routine 3/23/2017 2/16/2018 2/16/2017    Maternal Fetal BPP Single Routine 2/27/2017 2/16/2018 2/16/2017    Maternal Fetal BPP Single Routine 3/2/2017 2/16/2018 2/16/2017    Maternal Fetal BPP Single Routine 3/6/2017 2/16/2018 2/16/2017    Maternal Fetal BPP Single Routine 3/9/2017 2/16/2018 2/16/2017    Maternal Fetal BPP Single Routine 3/13/2017 2/16/2018 2/16/2017    Maternal Fetal BPP Single Routine 3/16/2017 2/16/2018 2/16/2017    Maternal Fetal BPP Single Routine 3/20/2017 2/16/2018 2/16/2017            Who to contact     If you have questions or need follow up information about today's clinic visit or your schedule please contact [employer] [facility]ER[facility]AL MEDIC[facility] directly at [phone].  Normal or non-critical lab and imaging results will be communicated to you by [employer], letter or phone within 4 business days after the clinic has received the results. If you do not hear from us within 7 days, please contact the clinic through [employer] or phone. If you have a critical or abnormal lab result, we will notify you by phone as soon as possible.  Submit refill requests through i[employer]24 or call your pharmacy and they will forward the refill request to us. Please allow 3 business days for your refill to be completed.          Additional Information About Your Visit        iDoc24 Information     iDoc24 lets you send messages to your doctor, view your test results, renew your prescriptions, schedule appointments and more. To sign up, go to [email or web address] . Click on \"Log in\" on the left side of the screen, which will take you to the Welcome page. Then click on \"Sign up Now\" on the right side of the page.     You will be asked to enter the access code listed below, as well as some personal information. Please follow the directions to create your username and password.     Your " access code is: [email or web address]  Expires: 3/11/2017  2:23 PM     Your access code will  in 90 days. If you need help or a new code, please call your [place] clinic or [phone].        Care EveryWhere ID     This is your Care EveryWhere ID. This could be used by other organizations to access your [place] medical records  [id number]         Blood Pressure from Last 3 Encounters:   17 115/78   17 129/85   17 126/76    Weight from Last 3 Encounters:   17 77.6 kg (171 lb)   17 75.8 kg (167 lb)   10/17/07 88.7 kg (195 lb 9.6 oz)               Primary Care Provider Office Phone # Fax #    Farideh Cabello [phone]        [facility] [address] [facility] [address]        Thank you!     Thank you for choosing [facility]  for your care. Our goal is always to provide you with excellent care. Hearing back from our patients is one way we can continue to improve our services. Please take a few minutes to complete the written survey that you may receive in the mail after your visit with us. Thank you!             Your Updated Medication List - Protect others around you: Learn how to safely use, store and throw away your medicines at www.disposemymeds.org.          This list is accurate as of: 17  1:08 PM.  Always use your most recent med list.                   Brand Name Dispense Instructions for use    acetaminophen 325 MG tablet    TYLENOL    100 tablet    Take 2 tablets (650 mg) by mouth every 4 hours as needed for mild pain       blood glucose monitoring lancets     1 Box    Use to test blood sugar 4 times daily or as directed.       blood glucose monitoring meter device kit     1 kit    Use to test blood sugar 4 times daily or as directed.       buprenorphine HCl-naloxone HCl 8-2 MG per film    SUBOXONE     Place 1 Film under the tongue 2 times daily       LEVEMIR FLEXPEN/FLEXTOUCH 100 UNIT/ML injection    Generic drug:  insulin detemir      Inject 30 Units Subcutaneous every morning       NovoLOG FLEXPEN 100 UNIT/ML injection   Generic drug:  insulin aspart      Inject Subcutaneous 3 times daily (with meals)       ranitidine 150 MG tablet    ZANTAC    60 tablet    Take 1 tablet (150 mg) by mouth 2 times daily       sennosides 8.6 MG tablet    SENOKOT    60 tablet    Take 1 tablet by mouth 2 times daily       sertraline 50 MG tablet    ZOLOFT    60 tablet    Take 1 tablet (50 mg) by mouth daily

## 2017-02-21 ENCOUNTER — HOSPITAL ENCOUNTER (OUTPATIENT)
Dept: ULTRASOUND IMAGING | Facility: CLINIC | Age: 42
Discharge: HOME | End: 2017-02-21
Attending: OBSTETRICS & GYNECOLOGY | Admitting: OBSTETRICS & GYNECOLOGY
Payer: COMMERCIAL

## 2017-02-21 ENCOUNTER — OFFICE VISIT (OUTPATIENT)
Dept: MATERNAL FETAL MEDICINE | Facility: CLINIC | Age: 42
End: 2017-02-21
Attending: OBSTETRICS & GYNECOLOGY
Payer: COMMERCIAL

## 2017-02-21 DIAGNOSIS — O36.5930 INTRAUTERINE GROWTH RESTRICTION (IUGR) AFFECTING CARE OF MOTHER, THIRD TRIMESTER, SINGLE GESTATION: Primary | ICD-10-CM

## 2017-02-21 DIAGNOSIS — O36.5930 POOR FETAL GROWTH AFFECTING MANAGEMENT OF MOTHER IN THIRD TRIMESTER, NOT APPLICABLE OR UNSPECIFIED FETUS: ICD-10-CM

## 2017-02-21 PROCEDURE — 76820 UMBILICAL ARTERY ECHO: CPT

## 2017-02-21 PROCEDURE — 76819 FETAL BIOPHYS PROFIL W/O NST: CPT

## 2017-02-21 NOTE — PROGRESS NOTES
"Please see \"Imaging\" tab under \"Chart Review\" for details of today's US at the [facility].    Teodoro Ace MD  Maternal-Fetal Medicine      "

## 2017-02-21 NOTE — MR AVS SNAPSHOT
After Visit Summary   2/21/2017    Cindy Fisher    MRN: [id number]           Patient Information     Date Of Birth          1975        Visit Information        Provider Department      2/21/2017 4:00 PM Teodoro Ace MD [facility] Maternal Fetal Medicine - [place]        Today's Diagnoses     Intrauterine growth restriction (IUGR) affecting care of mother, third trimester, single gestation    -  1       Follow-ups after your visit        Your next 10 appointments already scheduled     Feb 21, 2017  4:00 PM CST   Radiology MD with Teodoro Ace MD   [facility] Maternal Fetal Medicine - [facility])    [address]  [facility] [address]   [phone]           Please arrive at the time given for your first appointment.  This visit is used internally to schedule the physician's time during your ultrasound.            Feb 24, 2017  8:00 AM SMITH AN COMPRE SINGLE F/U with URMFMUSR3   [facility] Maternal Fetal Medicine Ultrasound - [facility])    [address]  [facility] [address] [phone]           Wear comfortable clothes and leave your valuables at home.            Feb 24, 2017  8:30 AM CST   Radiology MD with LUANNE BUSH MD   [facility] - [facility])    [address]  [facility] [address]   [phone]           Please arrive at the time given for your first appointment.  This visit is used internally to schedule the physician's time during your ultrasound.            Feb 28, 2017  3:00 PM SMITH GONZALEZ SINGLE with URMFMUSR2   [facility] Ultrasound - [place] ([facility])    [address]  [facility] [address] [phone]            Feb 28, 2017  3:30 PM CST   Radiology MD with LUANNE BUSH MD   [facility] Maternal Fetal Medicine -  [place] ([facility])    [address]  [facility] [address]   [phone]           Please arrive at the time given for your first appointment.  This visit is used internally to schedule the physician's time during your ultrasound.            Mar 03, 2017  3:00 PM SMITH GONZALEZ SINGLE with URMFMUSR1   [facility] - [place] ([facility])    [address]  [facility] [address]   [phone]            Mar 03, 2017  3:30 PM CST   Radiology MD with LUANNE BUSH MD   [facility] Maternal Fetal Medicine - [place] ([facility])    [address]  [facility] [address]   [phone]           Please arrive at the time given for your first appointment.  This visit is used internally to schedule the physician's time during your ultrasound.            Mar 07, 2017  1:30 PM SMITH GONZALEZ SINGLE with URMFMUSR2   [facility] - [place] ([facility])    [address]  [facility] [address]   [phone]            Mar 07, 2017  2:00 PM CST   Radiology MD with LUANNE BUSH MD   [facility] Maternal Fetal Medicine - [place] ([facility])    [address]  [facility] [address]   [phone]           Please arrive at the time given for your first appointment.  This visit is used internally to schedule the physician's time during your ultrasound.            Mar 10, 2017 10:15 AM SMITH GONZALEZ SINGLE with URMFMUSR4   [facility] Ultrasound - [place] ([facility])    [address]  [facility] [address]   [phone]              Who to contact     If you have questions or need follow up information about today's clinic visit or your schedule please contact [facility] MATERNAL FETAL MEDICINE -  "[place] directly at [phone].  Normal or non-critical lab and imaging results will be communicated to you by MyChart, letter or phone within 4 business days after the clinic has received the results. If you do not hear from us within 7 days, please contact the clinic through [employer] or phone. If you have a critical or abnormal lab result, we will notify you by phone as soon as possible.  Submit refill requests through [employer] or call your pharmacy and they will forward the refill request to us. Please allow 3 business days for your refill to be completed.          Additional Information About Your Visit        [employer] Information     [employer] lets you send messages to your doctor, view your test results, renew your prescriptions, schedule appointments and more. To sign up, go to [email or web address] . Click on \"Log in\" on the left side of the screen, which will take you to the Welcome page. Then click on \"Sign up Now\" on the right side of the page.     You will be asked to enter the access code listed below, as well as some personal information. Please follow the directions to create your username and password.     Your access code is: [email or web address]  Expires: 3/11/2017  2:23 PM     Your access code will  in 90 days. If you need help or a new code, please call your [place] clinic or [phone].        Care EveryWhere ID     This is your Care EveryWhere ID. This could be used by other organizations to access your [place] medical records  [id number]         Blood Pressure from Last 3 Encounters:   17 115/78   17 129/85   17 126/76    Weight from Last 3 Encounters:   17 77.6 kg (171 lb)   17 75.8 kg (167 lb)   10/17/07 88.7 kg (195 lb 9.6 oz)              Today, you had the following     No orders found for display       Primary Care Provider Office Phone # Fax #    Farideh Cabello [phone]       [place] [address] [facility] " [place] [address]        Thank you!     Thank you for choosing [facility]  for your care. Our goal is always to provide you with excellent care. Hearing back from our patients is one way we can continue to improve our services. Please take a few minutes to complete the written survey that you may receive in the mail after your visit with us. Thank you!             Your Updated Medication List - Protect others around you: Learn how to safely use, store and throw away your medicines at www.disposemymeds.org.          This list is accurate as of: 2/21/17  3:59 PM.  Always use your most recent med list.                   Brand Name Dispense Instructions for use    acetaminophen 325 MG tablet    TYLENOL    100 tablet    Take 2 tablets (650 mg) by mouth every 4 hours as needed for mild pain       blood glucose monitoring lancets     1 Box    Use to test blood sugar 4 times daily or as directed.       blood glucose monitoring meter device kit     1 kit    Use to test blood sugar 4 times daily or as directed.       buprenorphine HCl-naloxone HCl 8-2 MG per film    SUBOXONE     Place 1 Film under the tongue 2 times daily       LEVEMIR FLEXPEN/FLEXTOUCH 100 UNIT/ML injection   Generic drug:  insulin detemir      Inject 30 Units Subcutaneous every morning       NovoLOG FLEXPEN 100 UNIT/ML injection   Generic drug:  insulin aspart      Inject Subcutaneous 3 times daily (with meals)       ranitidine 150 MG tablet    ZANTAC    60 tablet    Take 1 tablet (150 mg) by mouth 2 times daily       sennosides 8.6 MG tablet    SENOKOT    60 tablet    Take 1 tablet by mouth 2 times daily       sertraline 50 MG tablet    ZOLOFT    60 tablet    Take 1 tablet (50 mg) by mouth daily

## 2017-02-24 ENCOUNTER — OFFICE VISIT (OUTPATIENT)
Dept: MATERNAL FETAL MEDICINE | Facility: CLINIC | Age: 42
End: 2017-02-24
Attending: OBSTETRICS & GYNECOLOGY
Payer: COMMERCIAL

## 2017-02-24 ENCOUNTER — HOSPITAL ENCOUNTER (OUTPATIENT)
Dept: ULTRASOUND IMAGING | Facility: CLINIC | Age: 42
Discharge: HOME | End: 2017-02-24
Attending: OBSTETRICS & GYNECOLOGY | Admitting: OBSTETRICS & GYNECOLOGY
Payer: COMMERCIAL

## 2017-02-24 DIAGNOSIS — O24.119 PRE-EXISTING TYPE 2 DIABETES MELLITUS DURING PREGNANCY, ANTEPARTUM: ICD-10-CM

## 2017-02-24 DIAGNOSIS — O35.10X1 SUSPECTED FETAL CHROMOSOME ANOMALY AFFECTING ANTEPARTUM CARE OF MOTHER, FETUS 1: ICD-10-CM

## 2017-02-24 DIAGNOSIS — O36.5930 POOR FETAL GROWTH AFFECTING MANAGEMENT OF MOTHER IN THIRD TRIMESTER, NOT APPLICABLE OR UNSPECIFIED FETUS: ICD-10-CM

## 2017-02-24 DIAGNOSIS — O36.5930 INTRAUTERINE GROWTH RESTRICTION (IUGR) AFFECTING CARE OF MOTHER, THIRD TRIMESTER, SINGLE GESTATION: Primary | ICD-10-CM

## 2017-02-24 PROCEDURE — 76816 OB US FOLLOW-UP PER FETUS: CPT

## 2017-02-24 PROCEDURE — 76819 FETAL BIOPHYS PROFIL W/O NST: CPT | Performed by: OBSTETRICS & GYNECOLOGY

## 2017-02-24 NOTE — MR AVS SNAPSHOT
After Visit Summary   2/24/2017    Cindy Fisher    MRN: [id number]           Patient Information     Date Of Birth          1975        Visit Information        Provider Department      2/24/2017 10:00 AM Landon Goldberg MD [facility] Maternal Fetal Medicine - [place]        Today's Diagnoses     Intrauterine growth restriction (IUGR) affecting care of mother, third trimester, single gestation    -  1    Suspected fetal chromosome anomaly affecting antepartum care of mother, fetus 1        Pre-existing type 2 diabetes mellitus during pregnancy, antepartum           Follow-ups after your visit        Your next 10 appointments already scheduled     Feb 28, 2017  3:00 PM CST   ELEAZAR GONZALEZ SINGLE with URMFMUSR2   [facility] - [facility])    [address]  [facility] [address]   [phone]            Feb 28, 2017  3:30 PM CST   Radiology MD with LUANNE BUSH MD   [facility] Maternal Fetal Medicine - [facility])    [address]  [facility] [address]   [phone]           Please arrive at the time given for your first appointment.  This visit is used internally to schedule the physician's time during your ultrasound.            Mar 03, 2017  3:00 PM CST   ELEAZAR GONZALEZ SINGLE with URMFMUSR1   [facility] Maternal Fetal Medicine Ultrasound - [place] ([facility])    [address]  [facility] [address]   [phone]            Mar 03, 2017  3:30 PM CST   Radiology MD with LUANNE BUSH MD   [facility] Maternal Fetal Medicine - [facility])    [address]  [facility] [address]   [phone]           Please arrive at the time given for your first appointment.  This visit is used internally to schedule the physician's time during your ultrasound.            Mar 07, 2017  1:30 PM CST    ELEAZAR BPMARY LOU SINGLE with URMFMUSR2   [facility] - [place] ([facility])    [address]  [facility] [address]   [phone]            Mar 07, 2017  2:00 PM CST   Radiology MD with LUANNE BUSH MD   [facility] - [place] ([facility])    [address]  [facility] [place] [address]   [phone]           Please arrive at the time given for your first appointment.  This visit is used internally to schedule the physician's time during your ultrasound.            Mar 10, 2017 10:15 AM CST   ELEAZAR GONZALEZ SINGLE with URMFMUSR4   [facility] - [place] ([facility])    [address]  [facility] [address]   [phone]            Mar 10, 2017 10:45 AM CST   Radiology MD with LUANNE BUSH MD   [facility] - [place] ([facility])    [address]  [facility] [address]   [phone]           Please arrive at the time given for your first appointment.  This visit is used internally to schedule the physician's time during your ultrasound.            Mar 14, 2017  1:30 PM CDT   ELEAZAR GONZALEZ SINGLE with URMFMUSR1   [facility] - [place] ([facility])    [address]  [facility] [address]   [phone]            Mar 14, 2017  2:00 PM CDT   Radiology MD with LUANNE BUSH MD   [facility] - [place] ([facility])    [address]  [facility] [address]   [phone]           Please arrive at the time given for your first appointment.  This visit is used internally to schedule the physician's time during your ultrasound.              Who to contact     If you have questions or need follow up information about today's clinic visit or  "your schedule please contact [facility] MATERNAL FETAL MEDICINE [facility] directly at [phone].  Normal or non-critical lab and imaging results will be communicated to you by MyChart, letter or phone within 4 business days after the clinic has received the results. If you do not hear from us within 7 days, please contact the clinic through [employer] or phone. If you have a critical or abnormal lab result, we will notify you by phone as soon as possible.  Submit refill requests through [employer] or call your pharmacy and they will forward the refill request to us. Please allow 3 business days for your refill to be completed.          Additional Information About Your Visit        [employer] Information     [employer] lets you send messages to your doctor, view your test results, renew your prescriptions, schedule appointments and more. To sign up, go to [email or web address] . Click on \"Log in\" on the left side of the screen, which will take you to the Welcome page. Then click on \"Sign up Now\" on the right side of the page.     You will be asked to enter the access code listed below, as well as some personal information. Please follow the directions to create your username and password.     Your access code is: [email or web address]  Expires: 3/11/2017  2:23 PM     Your access code will  in 90 days. If you need help or a new code, please call your [place] clinic or [phone].        Care EveryWhere ID     This is your Care EveryWhere ID. This could be used by other organizations to access your [place] medical records  [id number]         Blood Pressure from Last 3 Encounters:   17 115/78   17 129/85   17 126/76    Weight from Last 3 Encounters:   17 77.6 kg (171 lb)   17 75.8 kg (167 lb)   10/17/07 88.7 kg (195 lb 9.6 oz)              Today, you had the following     No orders found for display       Primary Care Provider Office Phone # Fax #    Farideh Cabello [phone]    "     [facility] [address]  [facility] [address]        Thank you!     Thank you for choosing [facility]  for your care. Our goal is always to provide you with excellent care. Hearing back from our patients is one way we can continue to improve our services. Please take a few minutes to complete the written survey that you may receive in the mail after your visit with us. Thank you!             Your Updated Medication List - Protect others around you: Learn how to safely use, store and throw away your medicines at www.disposemymeds.org.          This list is accurate as of: 2/24/17  4:25 PM.  Always use your most recent med list.                   Brand Name Dispense Instructions for use    acetaminophen 325 MG tablet    TYLENOL    100 tablet    Take 2 tablets (650 mg) by mouth every 4 hours as needed for mild pain       blood glucose monitoring lancets     1 Box    Use to test blood sugar 4 times daily or as directed.       blood glucose monitoring meter device kit     1 kit    Use to test blood sugar 4 times daily or as directed.       buprenorphine HCl-naloxone HCl 8-2 MG per film    SUBOXONE     Place 1 Film under the tongue 2 times daily       LEVEMIR FLEXPEN/FLEXTOUCH 100 UNIT/ML injection   Generic drug:  insulin detemir      Inject 30 Units Subcutaneous every morning       NovoLOG FLEXPEN 100 UNIT/ML injection   Generic drug:  insulin aspart      Inject Subcutaneous 3 times daily (with meals)       ranitidine 150 MG tablet    ZANTAC    60 tablet    Take 1 tablet (150 mg) by mouth 2 times daily       sennosides 8.6 MG tablet    SENOKOT    60 tablet    Take 1 tablet by mouth 2 times daily       sertraline 50 MG tablet    ZOLOFT    60 tablet    Take 1 tablet (50 mg) by mouth daily

## 2017-02-28 ENCOUNTER — OFFICE VISIT (OUTPATIENT)
Dept: MATERNAL FETAL MEDICINE | Facility: CLINIC | Age: 42
End: 2017-02-28
Attending: OBSTETRICS & GYNECOLOGY
Payer: COMMERCIAL

## 2017-02-28 ENCOUNTER — HOSPITAL ENCOUNTER (OUTPATIENT)
Dept: ULTRASOUND IMAGING | Facility: CLINIC | Age: 42
Discharge: HOME | End: 2017-02-28
Attending: OBSTETRICS & GYNECOLOGY | Admitting: OBSTETRICS & GYNECOLOGY
Payer: COMMERCIAL

## 2017-02-28 DIAGNOSIS — O36.5930 INTRAUTERINE GROWTH RESTRICTION (IUGR) AFFECTING CARE OF MOTHER, THIRD TRIMESTER, SINGLE GESTATION: ICD-10-CM

## 2017-02-28 DIAGNOSIS — O35.10X1 SUSPECTED FETAL CHROMOSOME ANOMALY AFFECTING ANTEPARTUM CARE OF MOTHER, FETUS 1: ICD-10-CM

## 2017-02-28 DIAGNOSIS — O24.119 PRE-EXISTING TYPE 2 DIABETES MELLITUS DURING PREGNANCY, ANTEPARTUM: ICD-10-CM

## 2017-02-28 DIAGNOSIS — O36.5930 INTRAUTERINE GROWTH RESTRICTION (IUGR) AFFECTING CARE OF MOTHER, THIRD TRIMESTER, SINGLE GESTATION: Primary | ICD-10-CM

## 2017-02-28 PROCEDURE — 76820 UMBILICAL ARTERY ECHO: CPT | Performed by: OBSTETRICS & GYNECOLOGY

## 2017-02-28 PROCEDURE — 76819 FETAL BIOPHYS PROFIL W/O NST: CPT

## 2017-02-28 NOTE — MR AVS SNAPSHOT
After Visit Summary   2/28/2017    Cindy Fisher    MRN: [id number]           Patient Information     Date Of Birth          1975        Visit Information        Provider Department      2/28/2017 3:30 PM Teodoro Ace MD [facility] Maternal Fetal Medicine - [place]        Today's Diagnoses     Intrauterine growth restriction (IUGR) affecting care of mother, third trimester, single gestation    -  1    Pre-existing type 2 diabetes mellitus during pregnancy, antepartum           Follow-ups after your visit        Your next 10 appointments already scheduled     Mar 03, 2017  3:00 PM CST   MFM BPP SINGLE with URMFMUSR1   [facility] - [facility])    [address]  [facility] [address]   [phone]            Mar 03, 2017  3:30 PM CST   Radiology MD with LUANNE BUSH MD   [facility] Maternal Fetal Medicine - [facility])    [address]  [facility] [address]   [phone]           Please arrive at the time given for your first appointment.  This visit is used internally to schedule the physician's time during your ultrasound.            Mar 07, 2017  1:30 PM CST   MFM BPP SINGLE with URMFMUSR2   [facility] - [place] ([facility])    [address]  [facility] [address]   [phone]            Mar 07, 2017  2:00 PM CST   Radiology MD with LUANNE BUSH MD   [facility] Maternal Fetal Medicine - [place] ([facility])    [address]  [facility] [address]   [phone]           Please arrive at the time given for your first appointment.  This visit is used internally to schedule the physician's time during your ultrasound.            Mar 10, 2017 10:15 AM CST   MFM BPP SINGLE with URMFMUSR4   [facility] -  [place] ([facility])    [address]  [facility] [address]   [phone]            Mar 10, 2017 10:45 AM CST   Radiology MD with LUANNE BUSH MD   [facility] - [place] ([facility])    [address]  [facility] [place] [address]   [phone]           Please arrive at the time given for your first appointment.  This visit is used internally to schedule the physician's time during your ultrasound.            Mar 14, 2017  1:30 PM CDT   ELEAZAR GONZALEZ SINGLE with URMFMUSR1   [facility] Ultrasound - [place] ([facility])    [address]  [facility] [address]   [phone]            Mar 14, 2017  2:00 PM CDT   Radiology MD with LUANNE BUSH MD   [facility] Maternal Fetal Medicine - [place] ([facility])    [address]  [facility] [address]   [phone]           Please arrive at the time given for your first appointment.  This visit is used internally to schedule the physician's time during your ultrasound.            Mar 17, 2017  8:45 AM CDT   ELEAZAR GONZALEZ SINGLE with URMFMUSR4   [facility] Ultrasound - [place] ([facility])    [address]  [facility] [address]   [phone]            Mar 17, 2017  9:15 AM CDT   Radiology MD with LUANNE BUSH MD   [facility] Maternal Fetal Medicine - [place] ([facility])    [address]  [facility] [address]   [phone]           Please arrive at the time given for your first appointment.  This visit is used internally to schedule the physician's time during your ultrasound.              Who to contact     If you have questions or need follow up information about today's clinic visit or your schedule please contact [facility] MATERNAL FETAL MEDICINE [facility] directly  "at [phone].  Normal or non-critical lab and imaging results will be communicated to you by MyChart, letter or phone within 4 business days after the clinic has received the results. If you do not hear from us within 7 days, please contact the clinic through [employer] or phone. If you have a critical or abnormal lab result, we will notify you by phone as soon as possible.  Submit refill requests through [employer] or call your pharmacy and they will forward the refill request to us. Please allow 3 business days for your refill to be completed.          Additional Information About Your Visit        Gun.[email or web address] Information     [employer] lets you send messages to your doctor, view your test results, renew your prescriptions, schedule appointments and more. To sign up, go to [email or web address] . Click on \"Log in\" on the left side of the screen, which will take you to the Welcome page. Then click on \"Sign up Now\" on the right side of the page.     You will be asked to enter the access code listed below, as well as some personal information. Please follow the directions to create your username and password.     Your access code is: [email or web address]  Expires: 3/11/2017  2:23 PM     Your access code will  in 90 days. If you need help or a new code, please call your [place] clinic or [phone].        Care EveryWhere ID     This is your Care EveryWhere ID. This could be used by other organizations to access your [place] medical records  [id number]         Blood Pressure from Last 3 Encounters:   17 115/78   17 129/85   17 126/76    Weight from Last 3 Encounters:   17 77.6 kg (171 lb)   17 75.8 kg (167 lb)   10/17/07 88.7 kg (195 lb 9.6 oz)              Today, you had the following     No orders found for display       Primary Care Provider Office Phone # Fax #    Farideh Cabello [phone]       [facility] [address] [facility] [address]      "   Thank you!     Thank you for choosing [facility]  for your care. Our goal is always to provide you with excellent care. Hearing back from our patients is one way we can continue to improve our services. Please take a few minutes to complete the written survey that you may receive in the mail after your visit with us. Thank you!             Your Updated Medication List - Protect others around you: Learn how to safely use, store and throw away your medicines at www.disposemymeds.org.          This list is accurate as of: 2/28/17  3:32 PM.  Always use your most recent med list.                   Brand Name Dispense Instructions for use    acetaminophen 325 MG tablet    TYLENOL    100 tablet    Take 2 tablets (650 mg) by mouth every 4 hours as needed for mild pain       blood glucose monitoring lancets     1 Box    Use to test blood sugar 4 times daily or as directed.       blood glucose monitoring meter device kit     1 kit    Use to test blood sugar 4 times daily or as directed.       buprenorphine HCl-naloxone HCl 8-2 MG per film    SUBOXONE     Place 1 Film under the tongue 2 times daily       LEVEMIR FLEXPEN/FLEXTOUCH 100 UNIT/ML injection   Generic drug:  insulin detemir      Inject 30 Units Subcutaneous every morning       NovoLOG FLEXPEN 100 UNIT/ML injection   Generic drug:  insulin aspart      Inject Subcutaneous 3 times daily (with meals)       ranitidine 150 MG tablet    ZANTAC    60 tablet    Take 1 tablet (150 mg) by mouth 2 times daily       sennosides 8.6 MG tablet    SENOKOT    60 tablet    Take 1 tablet by mouth 2 times daily       sertraline 50 MG tablet    ZOLOFT    60 tablet    Take 1 tablet (50 mg) by mouth daily

## 2017-03-02 DIAGNOSIS — Z53.9 ERRONEOUS ENCOUNTER--DISREGARD: Primary | ICD-10-CM

## 2017-03-03 ENCOUNTER — OFFICE VISIT (OUTPATIENT)
Dept: MATERNAL FETAL MEDICINE | Facility: CLINIC | Age: 42
End: 2017-03-03
Attending: OBSTETRICS & GYNECOLOGY
Payer: COMMERCIAL

## 2017-03-03 ENCOUNTER — HOSPITAL ENCOUNTER (OUTPATIENT)
Dept: ULTRASOUND IMAGING | Facility: CLINIC | Age: 42
Discharge: HOME | End: 2017-03-03
Attending: OBSTETRICS & GYNECOLOGY | Admitting: OBSTETRICS & GYNECOLOGY
Payer: COMMERCIAL

## 2017-03-03 DIAGNOSIS — O35.10X1 SUSPECTED FETAL CHROMOSOME ANOMALY AFFECTING ANTEPARTUM CARE OF MOTHER, FETUS 1: ICD-10-CM

## 2017-03-03 DIAGNOSIS — O36.5930 INTRAUTERINE GROWTH RESTRICTION (IUGR) AFFECTING CARE OF MOTHER, THIRD TRIMESTER, SINGLE GESTATION: ICD-10-CM

## 2017-03-03 DIAGNOSIS — O24.119 PRE-EXISTING TYPE 2 DIABETES MELLITUS DURING PREGNANCY, ANTEPARTUM: ICD-10-CM

## 2017-03-03 DIAGNOSIS — O36.5930 INTRAUTERINE GROWTH RESTRICTION (IUGR) AFFECTING CARE OF MOTHER, THIRD TRIMESTER, SINGLE GESTATION: Primary | ICD-10-CM

## 2017-03-03 PROCEDURE — 76819 FETAL BIOPHYS PROFIL W/O NST: CPT

## 2017-03-03 PROCEDURE — 76820 UMBILICAL ARTERY ECHO: CPT | Performed by: OBSTETRICS & GYNECOLOGY

## 2017-03-03 NOTE — PROGRESS NOTES
"Please see \"Imaging\" tab under \"Chart Review\" for details of today's US at the [facility].    Teodoro Ace MD  Maternal-Fetal Medicine      "

## 2017-03-03 NOTE — MR AVS SNAPSHOT
After Visit Summary   3/3/2017    Cindy Fisher    MRN: [id number]           Patient Information     Date Of Birth          1975        Visit Information        Provider Department      3/3/2017 4:00 PM Teodoro Ace MD [facility] Maternal Fetal Medicine - [place]        Today's Diagnoses     Intrauterine growth restriction (IUGR) affecting care of mother, third trimester, single gestation    -  1    Pre-existing type 2 diabetes mellitus during pregnancy, antepartum        Suspected fetal chromosome anomaly affecting antepartum care of mother, fetus 1           Follow-ups after your visit        Your next 10 appointments already scheduled     Mar 03, 2017  4:00 PM CST   Radiology MD with Teodoro Ace MD   [facility] Maternal Fetal Medicine - [facility])    [address]  [facility] [address]   [phone]           Please arrive at the time given for your first appointment.  This visit is used internally to schedule the physician's time during your ultrasound.            Mar 07, 2017  1:30 PM SMITH GONZALEZ SINGLE with URMFMUSR2   [facility] Maternal Fetal Medicine Ultrasound - [facility])    [address]  [facility] [address]   [phone]            Mar 07, 2017  2:00 PM CST   Radiology MD with LUANNE BUSH MD   [facility] Maternal Fetal Medicine - [facility])    [address]  [facility] [address]   [phone]           Please arrive at the time given for your first appointment.  This visit is used internally to schedule the physician's time during your ultrasound.            Mar 10, 2017 10:15 AM SMITH GONZALEZ SINGLE with URMFMUSR4   [facility] Maternal Fetal Medicine Ultrasound - [place] ([facility])    [address]  [facility] [address]   [phone]            Mar 10, 2017  10:45 AM CST   Radiology MD with LUANNE BUSH MD   [facility] [place] [facility])    [address]  [facility] [address]   [phone]           Please arrive at the time given for your first appointment.  This visit is used internally to schedule the physician's time during your ultrasound.            Mar 14, 2017  1:30 PM CDT   ELEAZAR BPP SINGLE with URMFMUSR1   [facility] [place] ([facility])    [address]  [facility] [address]   [phone]            Mar 14, 2017  2:00 PM CDT   Radiology MD with LUANNE BUSH MD   [facility] [place] [facility])    [address]  [facility] [address]   [phone]           Please arrive at the time given for your first appointment.  This visit is used internally to schedule the physician's time during your ultrasound.            Mar 17, 2017  8:45 AM CDT   ELEAZAR BPMARY LOU SINGLE with URMFMUSR4   [facility] [place] ([facility])    [address]  [facility] [address]   [phone]            Mar 17, 2017  9:15 AM CDT   Radiology MD with LUANNE BUSH MD   [facility] [place] ([facility])    [address]  [facility] [address]   [phone]           Please arrive at the time given for your first appointment.  This visit is used internally to schedule the physician's time during your ultrasound.            Mar 21, 2017  3:00 PM CDT   ELEAZAR BPP SINGLE with URMFMUSR3   [facility] [place] [facility])    [address]  [facility] [address]   [phone]              Who to contact     If you have questions or need follow up information about today's clinic  "visit or your schedule please contact [facility] MATERNAL FETAL MEDICINE [facility] directly at [phone].  Normal or non-critical lab and imaging results will be communicated to you by MyChart, letter or phone within 4 business days after the clinic has received the results. If you do not hear from us within 7 days, please contact the clinic through MyChart or phone. If you have a critical or abnormal lab result, we will notify you by phone as soon as possible.  Submit refill requests through [employer] or call your pharmacy and they will forward the refill request to us. Please allow 3 business days for your refill to be completed.          Additional Information About Your Visit        MyChart Information     [employer]Fire lets you send messages to your doctor, view your test results, renew your prescriptions, schedule appointments and more. To sign up, go to [email or web address] . Click on \"Log in\" on the left side of the screen, which will take you to the Welcome page. Then click on \"Sign up Now\" on the right side of the page.     You will be asked to enter the access code listed below, as well as some personal information. Please follow the directions to create your username and password.     Your access code is: [email or web address]  Expires: 3/11/2017  2:23 PM     Your access code will  in 90 days. If you need help or a new code, please call your [place] clinic or [phone].        Care EveryWhere ID     This is your Care EveryWhere ID. This could be used by other organizations to access your [place] medical records  [id number]         Blood Pressure from Last 3 Encounters:   17 115/78   17 129/85   17 126/76    Weight from Last 3 Encounters:   17 77.6 kg (171 lb)   17 75.8 kg (167 lb)   10/17/07 88.7 kg (195 lb 9.6 oz)              Today, you had the following     No orders found for display       Primary Care Provider Office Phone # Fax #    Farideh Cabello, BEBA [phone] " [phone]       [place] [address]  [facility] [address]        Thank you!     Thank you for choosing [facility]  for your care. Our goal is always to provide you with excellent care. Hearing back from our patients is one way we can continue to improve our services. Please take a few minutes to complete the written survey that you may receive in the mail after your visit with us. Thank you!             Your Updated Medication List - Protect others around you: Learn how to safely use, store and throw away your medicines at www.disposemymeds.org.          This list is accurate as of: 3/3/17  3:32 PM.  Always use your most recent med list.                   Brand Name Dispense Instructions for use    acetaminophen 325 MG tablet    TYLENOL    100 tablet    Take 2 tablets (650 mg) by mouth every 4 hours as needed for mild pain       blood glucose monitoring lancets     1 Box    Use to test blood sugar 4 times daily or as directed.       blood glucose monitoring meter device kit     1 kit    Use to test blood sugar 4 times daily or as directed.       buprenorphine HCl-naloxone HCl 8-2 MG per film    SUBOXONE     Place 1 Film under the tongue 2 times daily       LEVEMIR FLEXPEN/FLEXTOUCH 100 UNIT/ML injection   Generic drug:  insulin detemir      Inject 30 Units Subcutaneous every morning       NovoLOG FLEXPEN 100 UNIT/ML injection   Generic drug:  insulin aspart      Inject Subcutaneous 3 times daily (with meals)       ranitidine 150 MG tablet    ZANTAC    60 tablet    Take 1 tablet (150 mg) by mouth 2 times daily       sennosides 8.6 MG tablet    SENOKOT    60 tablet    Take 1 tablet by mouth 2 times daily       sertraline 50 MG tablet    ZOLOFT    60 tablet    Take 1 tablet (50 mg) by mouth daily

## 2017-03-07 ENCOUNTER — TELEPHONE (OUTPATIENT)
Dept: OBGYN | Facility: CLINIC | Age: 42
End: 2017-03-07

## 2017-03-07 ENCOUNTER — HOSPITAL ENCOUNTER (OUTPATIENT)
Dept: ULTRASOUND IMAGING | Facility: CLINIC | Age: 42
Discharge: HOME | End: 2017-03-07
Attending: OBSTETRICS & GYNECOLOGY | Admitting: OBSTETRICS & GYNECOLOGY
Payer: COMMERCIAL

## 2017-03-07 ENCOUNTER — OFFICE VISIT (OUTPATIENT)
Dept: MATERNAL FETAL MEDICINE | Facility: CLINIC | Age: 42
End: 2017-03-07
Attending: OBSTETRICS & GYNECOLOGY
Payer: COMMERCIAL

## 2017-03-07 DIAGNOSIS — O41.03X0 OLIGOHYDRAMNIOS IN SINGLETON PREGNANCY IN THIRD TRIMESTER: Primary | ICD-10-CM

## 2017-03-07 DIAGNOSIS — O36.5930 INTRAUTERINE GROWTH RESTRICTION (IUGR) AFFECTING CARE OF MOTHER, THIRD TRIMESTER, SINGLE GESTATION: ICD-10-CM

## 2017-03-07 DIAGNOSIS — O24.119 PRE-EXISTING TYPE 2 DIABETES MELLITUS DURING PREGNANCY, ANTEPARTUM: ICD-10-CM

## 2017-03-07 DIAGNOSIS — O35.10X1 SUSPECTED FETAL CHROMOSOME ANOMALY AFFECTING ANTEPARTUM CARE OF MOTHER, FETUS 1: ICD-10-CM

## 2017-03-07 PROCEDURE — 76819 FETAL BIOPHYS PROFIL W/O NST: CPT

## 2017-03-07 PROCEDURE — 76820 UMBILICAL ARTERY ECHO: CPT | Performed by: OBSTETRICS & GYNECOLOGY

## 2017-03-07 NOTE — MR AVS SNAPSHOT
After Visit Summary   3/7/2017    Cindy Fisher    MRN: [id number]           Patient Information     Date Of Birth          1975        Visit Information        Provider Department      3/7/2017 2:00 PM Boom Singer MD [facility] M[facility]etal [facility] - [place]        Today's Diagnoses     Oligohydramnios in elizabeth pregnancy in third trimester    -  1       Follow-ups after your visit        Your next 10 appointments already scheduled     Mar 10, 2017 10:15 AM CST   MFM BPP SINGLE with URMFMUSR4   [facility] - [facility])    [address]  [facility] [address]   [phone]            Mar 10, 2017 10:45 AM CST   Radiology MD with UR ELEAZAR ROMANO   [facility] - [facility])    [address]  [facility] [address]   [phone]           Please arrive at the time given for your first appointment.  This visit is used internally to schedule the physician's time during your ultrasound.            Mar 14, 2017  1:30 PM CDT   MFSUNDAY BPP SINGLE with URMFMUSR1   [facility] - [place] ([facility])    [address]  [facility] [address] [phone]            Mar 14, 2017  2:00 PM CDT   Radiology MD with UR ELEAZAR ROMANO   [facility] - [place] ([facility])    [address]  [facility] [address]   [phone]           Please arrive at the time given for your first appointment.  This visit is used internally to schedule the physician's time during your ultrasound.            Mar 17, 2017  8:45 AM CDT   MFM BPP SINGLE with URMFMUSR4   [facility] - [place] ([facility])    [address]  [facility] [address]    [phone]            Mar 17, 2017  9:15 AM CDT   Radiology MD with LUANNE BUSH MD   [facility]etal [facility] - [place] ([facility])    [address]  [facility] [address]   [phone]           Please arrive at the time given for your first appointment.  This visit is used internally to schedule the physician's time during your ultrasound.            Mar 21, 2017  3:00 PM CDT   ELEAZAR BPP SINGLE with URMFMUSR3   [facility] Medicine Ultrasound - [place] ([facility])    [address]  [facility] [address]   [phone]            Mar 21, 2017  3:30 PM CDT   Radiology MD with LUANNE BUSH MD   [facility] Maternal Fetal Medicine - [place] ([facility])    [address]  [facility] [address]   [phone]           Please arrive at the time given for your first appointment.  This visit is used internally to schedule the physician's time during your ultrasound.            Mar 24, 2017 10:15 AM CDT   ELEAZAR US COMPRE SINGLE F/U with URMFMUSR3   [facility] M[facility]etal Medicine Ultrasound - [place] ([facility])    [address]  [facility] [address] [phone]           Wear comfortable clothes and leave your valuables at home.            Mar 24, 2017 10:45 AM CDT   Radiology MD with LUANNE BUSH MD   [facility] - [place] ([facility])    [address]  [facility] [address]   [phone]           Please arrive at the time given for your first appointment.  This visit is used internally to schedule the physician's time during your ultrasound.              Who to contact     If you have questions or need follow up information about today's clinic visit or your schedule please contact [facility] MATERNAL FETAL MEDICINE [facility] directly at [phone].  Normal or  "non-critical lab and imaging results will be communicated to you by MyChart, letter or phone within 4 business days after the clinic has received the results. If you do not hear from us within 7 days, please contact the clinic through [employer] or phone. If you have a critical or abnormal lab result, we will notify you by phone as soon as possible.  Submit refill requests through [employer] or call your pharmacy and they will forward the refill request to us. Please allow 3 business days for your refill to be completed.          Additional Information About Your Visit        [employer] Information     [facility] lets you send messages to your doctor, view your test results, renew your prescriptions, schedule appointments and more. To sign up, go to [email or web address] . Click on \"Log in\" on the left side of the screen, which will take you to the Welcome page. Then click on \"Sign up Now\" on the right side of the page.     You will be asked to enter the access code listed below, as well as some personal information. Please follow the directions to create your username and password.     Your access code is: [email or web address]  Expires: 3/11/2017  2:23 PM     Your access code will  in 90 days. If you need help or a new code, please call your [place] clinic or [phone].        Care EveryWhere ID     This is your Care EveryWhere ID. This could be used by other organizations to access your [place] medical records  [id number]         Blood Pressure from Last 3 Encounters:   17 115/78   17 129/85   17 126/76    Weight from Last 3 Encounters:   17 77.6 kg (171 lb)   17 75.8 kg (167 lb)   10/17/07 88.7 kg (195 lb 9.6 oz)              Today, you had the following     No orders found for display       Primary Care Provider Office Phone # Fax #    Farideh Cabello [phone]        [facility] [address] [facility] [address]        Thank you!     Thank you for " choosing [facility]  for your care. Our goal is always to provide you with excellent care. Hearing back from our patients is one way we can continue to improve our services. Please take a few minutes to complete the written survey that you may receive in the mail after your visit with us. Thank you!             Your Updated Medication List - Protect others around you: Learn how to safely use, store and throw away your medicines at www.disposemymeds.org.          This list is accurate as of: 3/7/17  3:54 PM.  Always use your most recent med list.                   Brand Name Dispense Instructions for use    acetaminophen 325 MG tablet    TYLENOL    100 tablet    Take 2 tablets (650 mg) by mouth every 4 hours as needed for mild pain       blood glucose monitoring lancets     1 Box    Use to test blood sugar 4 times daily or as directed.       blood glucose monitoring meter device kit     1 kit    Use to test blood sugar 4 times daily or as directed.       buprenorphine HCl-naloxone HCl 8-2 MG per film    SUBOXONE     Place 1 Film under the tongue 2 times daily       LEVEMIR FLEXPEN/FLEXTOUCH 100 UNIT/ML injection   Generic drug:  insulin detemir      Inject 30 Units Subcutaneous every morning       NovoLOG FLEXPEN 100 UNIT/ML injection   Generic drug:  insulin aspart      Inject Subcutaneous 3 times daily (with meals)       ranitidine 150 MG tablet    ZANTAC    60 tablet    Take 1 tablet (150 mg) by mouth 2 times daily       sennosides 8.6 MG tablet    SENOKOT    60 tablet    Take 1 tablet by mouth 2 times daily       sertraline 50 MG tablet    ZOLOFT    60 tablet    Take 1 tablet (50 mg) by mouth daily

## 2017-03-07 NOTE — PROGRESS NOTES
"Please see \"Imaging\" tab under \"Chart Review\" for details of today's ultrasound.    Boom Singer M.D.  Specialist in Maternal-Fetal Medicine     "

## 2017-03-07 NOTE — TELEPHONE ENCOUNTER
Received call from Dr. Mallika Hudson (cell-[phone]) at [facility] who cares for patient with regards to suboxone dosing.  She wanted us to know she is happy to consult as needed during patient's admission for delivery.  Patient currently on Suboxone 8 mg BID.  She states no narcotics unless  delivery.  If  delivery, recommend limited number of oxycodone on discharge (#15-20).  Patient would likely also benefit from TAP block in that case.  Also important to notify Dr. Hudson about plan for discharge so they can help coordinate her suboxone at discharge.  Please keep her in the loop during patient's admission and hospital course.  Elinor Reyes MD

## 2017-03-08 ENCOUNTER — ANESTHESIA EVENT (OUTPATIENT)
Dept: OBGYN | Facility: CLINIC | Age: 42
End: 2017-03-08
Payer: COMMERCIAL

## 2017-03-08 ENCOUNTER — ANESTHESIA (OUTPATIENT)
Dept: OBGYN | Facility: CLINIC | Age: 42
End: 2017-03-08
Payer: COMMERCIAL

## 2017-03-08 ENCOUNTER — HOSPITAL ENCOUNTER (INPATIENT)
Facility: CLINIC | Age: 42
LOS: 4 days | Discharge: HOME | End: 2017-03-12
Attending: OBSTETRICS & GYNECOLOGY | Admitting: OBSTETRICS & GYNECOLOGY
Payer: COMMERCIAL

## 2017-03-08 DIAGNOSIS — D62 ANEMIA DUE TO BLOOD LOSS, ACUTE: ICD-10-CM

## 2017-03-08 DIAGNOSIS — Z98.51 S/P TUBAL LIGATION: ICD-10-CM

## 2017-03-08 PROBLEM — Z34.90 PREGNANCY, OBSTETRICAL CARE: Status: ACTIVE | Noted: 2017-03-08

## 2017-03-08 LAB
ABO + RH BLD: NORMAL
ABO + RH BLD: NORMAL
ALBUMIN UR-MCNC: NEGATIVE MG/DL
ALT SERPL W P-5'-P-CCNC: 10 U/L (ref 0–50)
AMPHETAMINES UR QL SCN: NORMAL
APPEARANCE UR: CLEAR
AST SERPL W P-5'-P-CCNC: 17 U/L (ref 0–45)
BASOPHILS # BLD AUTO: 0 10E9/L (ref 0–0.2)
BASOPHILS NFR BLD AUTO: 0.4 %
BILIRUB UR QL STRIP: NEGATIVE
BLD GP AB SCN SERPL QL: NORMAL
BLOOD BANK CMNT PATIENT-IMP: NORMAL
CANNABINOIDS UR QL: NORMAL
COCAINE UR QL: NORMAL
COLOR UR AUTO: YELLOW
CREAT SERPL-MCNC: 0.59 MG/DL (ref 0.52–1.04)
CREAT UR-MCNC: 103 MG/DL
DIFFERENTIAL METHOD BLD: ABNORMAL
EOSINOPHIL # BLD AUTO: 0.1 10E9/L (ref 0–0.7)
EOSINOPHIL NFR BLD AUTO: 1.8 %
ERYTHROCYTE [DISTWIDTH] IN BLOOD BY AUTOMATED COUNT: 13.9 % (ref 10–15)
GFR SERPL CREATININE-BSD FRML MDRD: NORMAL ML/MIN/1.7M2
GLUCOSE BLDC GLUCOMTR-MCNC: 74 MG/DL (ref 70–99)
GLUCOSE BLDC GLUCOMTR-MCNC: 76 MG/DL (ref 70–99)
GLUCOSE BLDC GLUCOMTR-MCNC: 85 MG/DL (ref 70–99)
GLUCOSE BLDC GLUCOMTR-MCNC: 99 MG/DL (ref 70–99)
GLUCOSE UR STRIP-MCNC: NEGATIVE MG/DL
HBA1C MFR BLD: 6.4 % (ref 4.3–6)
HCT VFR BLD AUTO: 30.1 % (ref 35–47)
HGB BLD-MCNC: 9.7 G/DL (ref 11.7–15.7)
HGB UR QL STRIP: NEGATIVE
HIV 1+2 AB+HIV1 P24 AG SERPL QL IA: NON REACTIVE
HIV1 P24 AG SER QL: NONREACTIVE
HIV1+2 AB SERPL QL IA: NONREACTIVE
IMM GRANULOCYTES # BLD: 0 10E9/L (ref 0–0.4)
IMM GRANULOCYTES NFR BLD: 0.4 %
KETONES UR STRIP-MCNC: 10 MG/DL
LEUKOCYTE ESTERASE UR QL STRIP: NEGATIVE
LYMPHOCYTES # BLD AUTO: 1.1 10E9/L (ref 0.8–5.3)
LYMPHOCYTES NFR BLD AUTO: 21.6 %
MCH RBC QN AUTO: 25.5 PG (ref 26.5–33)
MCHC RBC AUTO-ENTMCNC: 32.2 G/DL (ref 31.5–36.5)
MCV RBC AUTO: 79 FL (ref 78–100)
MONOCYTES # BLD AUTO: 0.2 10E9/L (ref 0–1.3)
MONOCYTES NFR BLD AUTO: 4.7 %
NEUTROPHILS # BLD AUTO: 3.5 10E9/L (ref 1.6–8.3)
NEUTROPHILS NFR BLD AUTO: 71.1 %
NITRATE UR QL: NEGATIVE
NRBC # BLD AUTO: 0 10*3/UL
NRBC BLD AUTO-RTO: 0 /100
OPIATES UR QL SCN: NORMAL
PCP UR QL SCN: NORMAL
PH UR STRIP: 6.5 PH (ref 5–7)
PLATELET # BLD AUTO: 283 10E9/L (ref 150–450)
PROT UR-MCNC: 0.2 G/L
PROT/CREAT 24H UR: 0.2 G/G CR (ref 0–0.2)
RAPID HIV INTERNAL CONTROL: NORMAL
RAPID HIV INTERPRETATION: NORMAL
RBC # BLD AUTO: 3.81 10E12/L (ref 3.8–5.2)
SP GR UR STRIP: 1.02 (ref 1–1.03)
SPECIMEN EXP DATE BLD: NORMAL
T PALLIDUM IGG+IGM SER QL: NEGATIVE
URN SPEC COLLECT METH UR: ABNORMAL
UROBILINOGEN UR STRIP-MCNC: 2 MG/DL (ref 0–2)
WBC # BLD AUTO: 4.9 10E9/L (ref 4–11)

## 2017-03-08 PROCEDURE — 84156 ASSAY OF PROTEIN URINE: CPT | Performed by: OBSTETRICS & GYNECOLOGY

## 2017-03-08 PROCEDURE — 86850 RBC ANTIBODY SCREEN: CPT | Performed by: OBSTETRICS & GYNECOLOGY

## 2017-03-08 PROCEDURE — 82565 ASSAY OF CREATININE: CPT | Performed by: OBSTETRICS & GYNECOLOGY

## 2017-03-08 PROCEDURE — 25900017 H RX MED GY IP 259 OP 259 PS 637: Performed by: OBSTETRICS & GYNECOLOGY

## 2017-03-08 PROCEDURE — 00000146 ZZHCL STATISTIC GLUCOSE BY METER IP

## 2017-03-08 PROCEDURE — 25000132 ZZH RX MED GY IP 250 OP 250 PS 637: Performed by: OBSTETRICS & GYNECOLOGY

## 2017-03-08 PROCEDURE — 85025 COMPLETE CBC W/AUTO DIFF WBC: CPT | Performed by: OBSTETRICS & GYNECOLOGY

## 2017-03-08 PROCEDURE — 80307 DRUG TEST PRSMV CHEM ANLYZR: CPT | Performed by: OBSTETRICS & GYNECOLOGY

## 2017-03-08 PROCEDURE — 83036 HEMOGLOBIN GLYCOSYLATED A1C: CPT | Performed by: OBSTETRICS & GYNECOLOGY

## 2017-03-08 PROCEDURE — S0191 MISOPROSTOL, ORAL, 200 MCG: HCPCS | Performed by: OBSTETRICS & GYNECOLOGY

## 2017-03-08 PROCEDURE — 87536 HIV-1 QUANT&REVRSE TRNSCRPJ: CPT | Performed by: OBSTETRICS & GYNECOLOGY

## 2017-03-08 PROCEDURE — 25000131 ZZH RX MED GY IP 250 OP 636 PS 637: Performed by: OBSTETRICS & GYNECOLOGY

## 2017-03-08 PROCEDURE — 84450 TRANSFERASE (AST) (SGOT): CPT | Performed by: OBSTETRICS & GYNECOLOGY

## 2017-03-08 PROCEDURE — 3E0R3CZ INTRODUCTION OF REGIONAL ANESTHETIC INTO SPINAL CANAL, PERCUTANEOUS APPROACH: ICD-10-PCS | Performed by: ANESTHESIOLOGY

## 2017-03-08 PROCEDURE — 87653 STREP B DNA AMP PROBE: CPT | Performed by: OBSTETRICS & GYNECOLOGY

## 2017-03-08 PROCEDURE — 86780 TREPONEMA PALLIDUM: CPT | Performed by: OBSTETRICS & GYNECOLOGY

## 2017-03-08 PROCEDURE — 3E0P7GC INTRODUCTION OF OTHER THERAPEUTIC SUBSTANCE INTO FEMALE REPRODUCTIVE, VIA NATURAL OR ARTIFICIAL OPENING: ICD-10-PCS | Performed by: OBSTETRICS & GYNECOLOGY

## 2017-03-08 PROCEDURE — 87806 HIV AG W/HIV1&2 ANTB W/OPTIC: CPT | Performed by: OBSTETRICS & GYNECOLOGY

## 2017-03-08 PROCEDURE — 81003 URINALYSIS AUTO W/O SCOPE: CPT | Performed by: OBSTETRICS & GYNECOLOGY

## 2017-03-08 PROCEDURE — 84460 ALANINE AMINO (ALT) (SGPT): CPT | Performed by: OBSTETRICS & GYNECOLOGY

## 2017-03-08 PROCEDURE — 00HU33Z INSERTION OF INFUSION DEVICE INTO SPINAL CANAL, PERCUTANEOUS APPROACH: ICD-10-PCS | Performed by: ANESTHESIOLOGY

## 2017-03-08 PROCEDURE — 86900 BLOOD TYPING SEROLOGIC ABO: CPT | Performed by: OBSTETRICS & GYNECOLOGY

## 2017-03-08 PROCEDURE — 12000030 ZZH R&B OB INTERMEDIATE UMMC

## 2017-03-08 PROCEDURE — 25800025 ZZH RX 258: Performed by: OBSTETRICS & GYNECOLOGY

## 2017-03-08 PROCEDURE — 25000128 H RX IP 250 OP 636: Performed by: OBSTETRICS & GYNECOLOGY

## 2017-03-08 PROCEDURE — 86901 BLOOD TYPING SEROLOGIC RH(D): CPT | Performed by: OBSTETRICS & GYNECOLOGY

## 2017-03-08 PROCEDURE — 36416 COLLJ CAPILLARY BLOOD SPEC: CPT | Performed by: OBSTETRICS & GYNECOLOGY

## 2017-03-08 RX ORDER — SODIUM CHLORIDE, SODIUM LACTATE, POTASSIUM CHLORIDE, CALCIUM CHLORIDE 600; 310; 30; 20 MG/100ML; MG/100ML; MG/100ML; MG/100ML
INJECTION, SOLUTION INTRAVENOUS CONTINUOUS
Status: DISCONTINUED | OUTPATIENT
Start: 2017-03-08 | End: 2017-03-09

## 2017-03-08 RX ORDER — METHYLERGONOVINE MALEATE 0.2 MG/ML
200 INJECTION INTRAVENOUS
Status: DISCONTINUED | OUTPATIENT
Start: 2017-03-08 | End: 2017-03-09

## 2017-03-08 RX ORDER — ACETAMINOPHEN 325 MG/1
650 TABLET ORAL EVERY 4 HOURS PRN
Status: DISCONTINUED | OUTPATIENT
Start: 2017-03-08 | End: 2017-03-09

## 2017-03-08 RX ORDER — MISOPROSTOL 100 UG/1
25 TABLET ORAL
Status: DISCONTINUED | OUTPATIENT
Start: 2017-03-08 | End: 2017-03-09

## 2017-03-08 RX ORDER — NALOXONE HYDROCHLORIDE 0.4 MG/ML
.1-.4 INJECTION, SOLUTION INTRAMUSCULAR; INTRAVENOUS; SUBCUTANEOUS
Status: DISCONTINUED | OUTPATIENT
Start: 2017-03-08 | End: 2017-03-09

## 2017-03-08 RX ORDER — ALBUTEROL SULFATE 90 UG/1
2 AEROSOL, METERED RESPIRATORY (INHALATION) PRN
COMMUNITY
Start: 2016-05-22 | End: 2019-09-05

## 2017-03-08 RX ORDER — METOCLOPRAMIDE HYDROCHLORIDE 5 MG/ML
10 INJECTION INTRAMUSCULAR; INTRAVENOUS EVERY 6 HOURS
Status: DISCONTINUED | OUTPATIENT
Start: 2017-03-08 | End: 2017-03-09

## 2017-03-08 RX ORDER — BUPRENORPHINE AND NALOXONE 4; 1 MG/1; MG/1
1 FILM, SOLUBLE BUCCAL; SUBLINGUAL DAILY
Status: DISCONTINUED | OUTPATIENT
Start: 2017-03-08 | End: 2017-03-12 | Stop reason: HOSPADM

## 2017-03-08 RX ORDER — BUPRENORPHINE AND NALOXONE 8; 2 MG/1; MG/1
1 FILM, SOLUBLE BUCCAL; SUBLINGUAL DAILY
Status: DISCONTINUED | OUTPATIENT
Start: 2017-03-08 | End: 2017-03-12 | Stop reason: HOSPADM

## 2017-03-08 RX ORDER — PENICILLIN G POTASSIUM 5000000 [IU]/1
5 INJECTION, POWDER, FOR SOLUTION INTRAMUSCULAR; INTRAVENOUS ONCE
Status: COMPLETED | OUTPATIENT
Start: 2017-03-08 | End: 2017-03-09

## 2017-03-08 RX ORDER — OXYTOCIN 10 [USP'U]/ML
10 INJECTION, SOLUTION INTRAMUSCULAR; INTRAVENOUS
Status: DISCONTINUED | OUTPATIENT
Start: 2017-03-08 | End: 2017-03-09

## 2017-03-08 RX ORDER — BUPRENORPHINE AND NALOXONE 4; 1 MG/1; MG/1
1 FILM, SOLUBLE BUCCAL; SUBLINGUAL ONCE
Status: DISCONTINUED | OUTPATIENT
Start: 2017-03-08 | End: 2017-03-09

## 2017-03-08 RX ORDER — CARBOPROST TROMETHAMINE 250 UG/ML
250 INJECTION, SOLUTION INTRAMUSCULAR
Status: DISCONTINUED | OUTPATIENT
Start: 2017-03-08 | End: 2017-03-09

## 2017-03-08 RX ORDER — DEXTROSE MONOHYDRATE 25 G/50ML
25-50 INJECTION, SOLUTION INTRAVENOUS
Status: DISCONTINUED | OUTPATIENT
Start: 2017-03-08 | End: 2017-03-09

## 2017-03-08 RX ORDER — ONDANSETRON 2 MG/ML
4 INJECTION INTRAMUSCULAR; INTRAVENOUS EVERY 6 HOURS PRN
Status: DISCONTINUED | OUTPATIENT
Start: 2017-03-08 | End: 2017-03-09

## 2017-03-08 RX ORDER — NICOTINE POLACRILEX 4 MG
15-30 LOZENGE BUCCAL
Status: DISCONTINUED | OUTPATIENT
Start: 2017-03-08 | End: 2017-03-09

## 2017-03-08 RX ORDER — SODIUM CHLORIDE 9 MG/ML
INJECTION, SOLUTION INTRAVENOUS CONTINUOUS
Status: DISCONTINUED | OUTPATIENT
Start: 2017-03-08 | End: 2017-03-09

## 2017-03-08 RX ORDER — SENNOSIDES 8.6 MG
1 TABLET ORAL 2 TIMES DAILY
Status: DISCONTINUED | OUTPATIENT
Start: 2017-03-08 | End: 2017-03-09

## 2017-03-08 RX ORDER — OXYTOCIN/0.9 % SODIUM CHLORIDE 30/500 ML
100-340 PLASTIC BAG, INJECTION (ML) INTRAVENOUS CONTINUOUS PRN
Status: DISCONTINUED | OUTPATIENT
Start: 2017-03-08 | End: 2017-03-09

## 2017-03-08 RX ORDER — DIPHENHYDRAMINE HYDROCHLORIDE 50 MG/ML
50 INJECTION INTRAMUSCULAR; INTRAVENOUS ONCE
Status: COMPLETED | OUTPATIENT
Start: 2017-03-09 | End: 2017-03-08

## 2017-03-08 RX ADMIN — Medication 25 MCG: at 11:27

## 2017-03-08 RX ADMIN — RANITIDINE 150 MG: 150 TABLET ORAL at 09:09

## 2017-03-08 RX ADMIN — SODIUM CHLORIDE, POTASSIUM CHLORIDE, SODIUM LACTATE AND CALCIUM CHLORIDE: 600; 310; 30; 20 INJECTION, SOLUTION INTRAVENOUS at 16:46

## 2017-03-08 RX ADMIN — SODIUM CHLORIDE, POTASSIUM CHLORIDE, SODIUM LACTATE AND CALCIUM CHLORIDE 500 ML: 600; 310; 30; 20 INJECTION, SOLUTION INTRAVENOUS at 16:46

## 2017-03-08 RX ADMIN — BUPRENORPHINE HYDROCHLORIDE, NALOXONE HYDROCHLORIDE 1 FILM: 8; 2 FILM, SOLUBLE BUCCAL; SUBLINGUAL at 09:25

## 2017-03-08 RX ADMIN — MORPHINE SULFATE 10 MG: 10 INJECTION, SOLUTION INTRAMUSCULAR; INTRAVENOUS at 23:59

## 2017-03-08 RX ADMIN — ONDANSETRON 4 MG: 2 INJECTION INTRAMUSCULAR; INTRAVENOUS at 12:43

## 2017-03-08 RX ADMIN — METOCLOPRAMIDE 10 MG: 5 INJECTION, SOLUTION INTRAMUSCULAR; INTRAVENOUS at 22:12

## 2017-03-08 RX ADMIN — BUPRENORPHINE HYDROCHLORIDE, NALOXONE HYDROCHLORIDE 1 FILM: 4; 1 FILM, SOLUBLE BUCCAL; SUBLINGUAL at 18:08

## 2017-03-08 RX ADMIN — SODIUM CHLORIDE, POTASSIUM CHLORIDE, SODIUM LACTATE AND CALCIUM CHLORIDE: 600; 310; 30; 20 INJECTION, SOLUTION INTRAVENOUS at 21:33

## 2017-03-08 RX ADMIN — ONDANSETRON 4 MG: 2 INJECTION INTRAMUSCULAR; INTRAVENOUS at 18:38

## 2017-03-08 RX ADMIN — SERTRALINE HYDROCHLORIDE 50 MG: 50 TABLET ORAL at 09:09

## 2017-03-08 RX ADMIN — DIPHENHYDRAMINE HYDROCHLORIDE 50 MG: 50 INJECTION, SOLUTION INTRAMUSCULAR; INTRAVENOUS at 23:59

## 2017-03-08 NOTE — PROGRESS NOTES
SPIRITUAL HEALTH SERVICES  SPIRITUAL ASSESSMENT Progress Note  [facility] ([facility]) 4COB   ON-CALL VISIT    On call response to page from unit regarding Cindy's desire for smudging. Unit confirms that Cindy had the needed materials and has already performed the ritual.    I will inform the unit  of support provided.    Savannah Bates  Chaplain Resident  Pager [phone]
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
## 2017-03-08 NOTE — IP AVS SNAPSHOT
[facility]    [address] [address]    Phone:  [phone]                                       After Visit Summary   3/8/2017    Cindy Fisher    MRN: [id number]           After Visit Summary Signature Page     I have received my discharge instructions, and my questions have been answered. I have discussed any challenges I see with this plan with the nurse or doctor.    ..........................................................................................................................................  Patient/Patient Representative Signature      ..........................................................................................................................................  Patient Representative Print Name and Relationship to Patient    ..................................................               ................................................  Date                                            Time    ..........................................................................................................................................  Reviewed by Signature/Title    ...................................................              ..............................................  Date                                                            Time

## 2017-03-08 NOTE — PROGRESS NOTES
Clinical Nutrition Services Progress Note - Brief    Cindy Fisher is a 41 year old female with an admission nutrition risk screen (+) for newly diagnosed/uncontrolled DM.    Per OB/Gyn note, patient at 36w0d with poorly controlled type 2 diabetes mellitus (on insulin). Patient not appropriate for RD visit at this time as patient here for delivery.      Please page or consult RD if patient becomes appropriate for DM diet education during stay prior to discharge.    Luna Collier RD, LD  [phone]

## 2017-03-08 NOTE — IP AVS SNAPSHOT
MRN:[id number]                      After Visit Summary   3/8/2017    Cindy Fisher    MRN: [id number]           Thank you!     Thank you for choosing [facility] for your care. Our goal is always to provide you with excellent care. Hearing back from our patients is one way we can continue to improve our services. Please take a few minutes to complete the written survey that you may receive in the mail after you visit with us. Thank you!        Patient Information     Date Of Birth          1975        About your hospital stay     You were [facility] on:  March 8, 2017 [facility]    You were discharged on:  March 12, 2017        Reason for your hospital stay       Delivery of your baby!                  Who to Call     For medical emergencies, please call 911.  For non-urgent questions about your medical care, please call your primary care provider or clinic, [phone]  For questions related to your surgery, please call your surgery clinic        Attending Provider     Provider Specialty    Mini Garnica MD OB/Gyn    Caryn Rojas MD OB/Gyn       Primary Care Provider Office Phone # Fax #    Farideh Cabello [phone]       [facility] [address] [address]         When to contact your care team       Call the [facility] clinic at [phone] or return to the ED if you have any of the following:    - Blood sugar >200   - Temperature greater than 100.4F  - Pain not controlled by pain medications  - any symptoms or preeclampsia, including: Severe headache, visual changes, chest pain, shortness of breath, pain in the upper right abdomen, or sudden increase in swelling  - Uncontrolled nausea/vomiting  - Foul-smelling vaginal discharge  - Vaginal bleeding soaking 1 pad per hour for 2 hours in a row                  After Care Instructions     Activity       Your activity upon discharge:  Activity as tolerated. No lifting >15lbs for 6 weeks. Pelvic rest for 6 weeks (no tampons, intercourse or douching). No driving for 2 weeks postpartum or any time you have taken a narcotic pain medication.            Diet       Follow this diet upon discharge: Diabetic diet                  Follow-up Appointments     Adult [facility] Follow-up and recommended labs and tests       - Follow-up with the [facility] within one week and then again in 6 weeks postpartum. Call the [facility] clinic at [phone] to make this appointment.     - Please make an appointment in the Infectious Disease Clinic within 1-2 weeks postpartum (phone # [phone]). Please use condoms with your partner during intercourse until seen in Infectious Disease Clinic.     - Please make an appointment with your Primary Care Provider within 1-2 weeks postpartum for management of your diabetes. Your blood sugars have been normal in the postpartum period, so do not take your regular insulin regimen until you have been seen by your doctor. Please continue to check your blood sugars 4x a day in the meantime.     Appointments on [place] and/or [facility] (with [facility] or [facility] provider or service). Call [phone] if you haven't heard regarding these appointments within 7 days of discharge.                  Further instructions from your care team       Postpartum Vaginal Delivery Instructions    Activity       Ask family and friends for help when you need it.    Do not place anything in your vagina for 6 weeks.    You are not restricted on other activities, but take it easy for a few weeks to allow your body to recover from delivery.  You are able to do any activities you feel up to that point.    No driving until you have stopped taking your pain medications (usually two weeks after delivery).     Call your health care provider if you have any of these symptoms:       Increased pain, swelling, redness, or  fluid around your stiches from an episiotomy or perineal tear.    A fever above 100.4 F (38 C) with or without chills when placing a thermometer under your tongue.    You soak a sanitary pad with blood within 1 hour, or you see blood clots larger than a golf ball.    Bleeding that lasts more than 6 weeks.    Vaginal discharge that smells bad.    Severe pain, cramping or tenderness in your lower belly area.    A need to urinate more frequently (use the toilet more often), more urgently (use the toilet very quickly), or it burns when you urinate.    Nausea and vomiting.    Redness, swelling or pain around a vein in your leg.    Problems breastfeeding or a red or painful area on your breast.    Chest pain and cough or are gasping for air.    Problems coping with sadness, anxiety, or depression.  If you have any concerns about hurting yourself or the baby, call your provider immediately.     You have questions or concerns after you return home.     Keep your hands clean:  Always wash your hands before touching your perineal area and stitches.  This helps reduce your risk of infection.  If your hands aren't dirty, you may use an alcohol hand-rub to clean your hands. Keep your nails clean and short.        Pending Results     Date and Time Order Name Status Description    3/10/2017 1922 EKG 12-lead, complete Preliminary             Statement of Approval     Ordered          03/12/17 1050  I have reviewed and agree with all the recommendations and orders detailed in this document.  EFFECTIVE NOW     Approved and electronically signed by:  Mini Garnica MD             Admission Information     Date & Time Provider Department Dept. Phone    3/8/2017 Caryn Rojas MD [facility] [phone]      Your Vitals Were     Blood Pressure Pulse Temperature Respirations Pulse Oximetry       136/85 88 97.9  F (36.6  C) (Oral) 16 98%       MyChart Information     [facility] lets you send messages to your doctor, view your  "test results, renew your prescriptions, schedule appointments and more. To sign up, go to [email or web address] . Click on \"Log in\" on the left side of the screen, which will take you to the Welcome page. Then click on \"Sign up Now\" on the right side of the page.     You will be asked to enter the access code listed below, as well as some personal information. Please follow the directions to create your username and password.     Your access code is: [email or web address]  Expires: 6/10/2017  3:14 PM     Your access code will  in 90 days. If you need help or a new code, please call your [place] clinic or [phone].        Care EveryWhere ID     This is your Care EveryWhere ID. This could be used by other organizations to access your [place] medical records  [id number]           Review of your medicines      START taking        Dose / Directions    ferrous gluconate 324 (38 FE) MG tablet   Commonly known as:  FERGON   Used for:  Anemia due to blood loss, acute        Dose:  324 mg   Take 1 tablet (324 mg) by mouth daily (with breakfast)   Quantity:  100 tablet   Refills:  1       ibuprofen 400 MG tablet   Commonly known as:  ADVIL/MOTRIN        Dose:  400-800 mg   Take 1-2 tablets (400-800 mg) by mouth every 6 hours as needed for other (cramping)   Quantity:  120 tablet   Refills:  1       oxyCODONE-acetaminophen 5-325 MG per tablet   Commonly known as:  PERCOCET   Used for:  S/P tubal ligation        Dose:  1 tablet   Take 1 tablet by mouth every 4 hours as needed for pain   Quantity:  10 tablet   Refills:  0       senna-docusate 8.6-50 MG per tablet   Commonly known as:  SENOKOT-S;PERICOLACE        Dose:  1-2 tablet   Take 1-2 tablets by mouth 2 times daily   Quantity:  100 tablet   Refills:  1         CONTINUE these medicines which have NOT CHANGED        Dose / Directions    albuterol 108 (90 BASE) MCG/ACT Inhaler   Commonly known as:  PROAIR HFA/PROVENTIL HFA/VENTOLIN HFA        Dose:  2 puff   Inhale 2 " puffs into the lungs as needed   Refills:  0       blood glucose monitoring lancets   Used for:  Type 2 diabetes mellitus with complication, unspecified long term insulin use status (H)        Use to test blood sugar 4 times daily or as directed.   Quantity:  1 Box   Refills:  0       blood glucose monitoring meter device kit   Used for:  Type 2 diabetes mellitus with complication, unspecified long term insulin use status (H)        Use to test blood sugar 4 times daily or as directed.   Quantity:  1 kit   Refills:  0       buprenorphine HCl-naloxone HCl 8-2 MG per film   Commonly known as:  SUBOXONE        Dose:  1 Film   Place 1 Film under the tongue 2 times daily   Refills:  0       ranitidine 150 MG tablet   Commonly known as:  ZANTAC   Used for:  Pregnant state, incidental        Dose:  150 mg   Take 1 tablet (150 mg) by mouth 2 times daily   Quantity:  60 tablet   Refills:  1       sertraline 50 MG tablet   Commonly known as:  ZOLOFT   Used for:  Withdrawal from opioids (H)        Dose:  50 mg   Take 1 tablet (50 mg) by mouth daily   Quantity:  60 tablet   Refills:  0         STOP taking     acetaminophen 325 MG tablet   Commonly known as:  TYLENOL           LEVEMIR FLEXPEN/FLEXTOUCH 100 UNIT/ML injection   Generic drug:  insulin detemir           NovoLOG FLEXPEN 100 UNIT/ML injection   Generic drug:  insulin aspart           sennosides 8.6 MG tablet   Commonly known as:  SENOKOT                Where to get your medicines      These medications were sent to [facility]  [address] [place] [address]     Phone:  [phone]     ferrous gluconate 324 (38 FE) MG tablet    ibuprofen 400 MG tablet    senna-docusate 8.6-50 MG per tablet         Some of these will need a paper prescription and others can be bought over the counter. Ask your nurse if you have questions.     Bring a paper prescription for each of these medications     oxyCODONE-acetaminophen 5-325 MG per  tablet                Protect others around you: Learn how to safely use, store and throw away your medicines at www.disposemymeds.org.             Medication List: This is a list of all your medications and when to take them. Check marks below indicate your daily home schedule. Keep this list as a reference.      Medications           Morning Afternoon Evening Bedtime As Needed    albuterol 108 (90 BASE) MCG/ACT Inhaler   Commonly known as:  PROAIR HFA/PROVENTIL HFA/VENTOLIN HFA   Inhale 2 puffs into the lungs as needed                                blood glucose monitoring lancets   Use to test blood sugar 4 times daily or as directed.                                blood glucose monitoring meter device kit   Use to test blood sugar 4 times daily or as directed.                                buprenorphine HCl-naloxone HCl 8-2 MG per film   Commonly known as:  SUBOXONE   Place 1 Film under the tongue 2 times daily   Last time this was given:  1 Film on 3/12/2017  9:12 AM                                ferrous gluconate 324 (38 FE) MG tablet   Commonly known as:  FERGON   Take 1 tablet (324 mg) by mouth daily (with breakfast)                                ibuprofen 400 MG tablet   Commonly known as:  ADVIL/MOTRIN   Take 1-2 tablets (400-800 mg) by mouth every 6 hours as needed for other (cramping)   Last time this was given:  800 mg on 3/11/2017  8:10 PM                                oxyCODONE-acetaminophen 5-325 MG per tablet   Commonly known as:  PERCOCET   Take 1 tablet by mouth every 4 hours as needed for pain                                ranitidine 150 MG tablet   Commonly known as:  ZANTAC   Take 1 tablet (150 mg) by mouth 2 times daily   Last time this was given:  150 mg on 3/12/2017  9:22 AM                                senna-docusate 8.6-50 MG per tablet   Commonly known as:  SENOKOT-S;PERICOLACE   Take 1-2 tablets by mouth 2 times daily   Last time this was given:  2 tablets on 3/12/2017  9:12 AM                                 sertraline 50 MG tablet   Commonly known as:  ZOLOFT   Take 1 tablet (50 mg) by mouth daily   Last time this was given:  50 mg on 3/12/2017  9:13 AM

## 2017-03-08 NOTE — H&P
[facility]  OB History and Physical      Cindy Fisher MRN# [id number]   Age: 41 year old YOB: 1975     CC:  Induction of labor    HPI:  Ms. Cindy Fisher is a 41 year old  at 36w0d by 6w6d US, who presents with induction of labor for asymmetric IUGR, abnormal dopplers, and oligohydramnios. She feels relatively good, is having contractions and cramping, but infrequent and intermittent. Denies vaginal bleeding, loss of fluid. + normal fetal movement.     Denies headache, dizziness, visual changes, chest pain, SOB, or abdominal pain. Does report bilateral pedal edema up to her knees which have been getting worse for the past 3 weeks.     Pregnancy Complications:  1. Advanced maternal age   2. History of polysubstance abuse (IV heroin, oxycodone, methamphetamines) currently on Suboxone: regimen is 8mg in AM and 4mg in PM. Several admissions for withdrawal this pregnancy  3. Poorly controled type 2 diabetes mellitus: per patient her regimen is 30U levemir, 10U novolog with meals.  Per Medical chart, regimen is 70U lantus and 20U novolog TID  4. NIPT +T21 with U/S findings of thickened nuchal cord, absent nasal bone, small stomach and possible inlet VSD on fetal ECHO: fetus will need fetal ECHO within 24hrs of birth  5. Asymmetric IUGR with AC<5%tile with Abnormal dopplers (elevated S:D)  6. Oligohydramnios with LINDA 4-5  7. GBS unknown  8. Depression on zoloft    Prenatal Labs:   Lab Results   Component Value Date    ABO A 2017    RH  Pos 2017    AS Neg 2017    HEPBANG Negative 2007    CHPCRT  2007     Negative for C. trachomatis rRNA by transcription mediated amplification.   A negative result by transcription mediated amplification does not preclude the   presence of C. trachomatis infection because results are dependent on proper   and adequate collection, absence of inhibitors, and sufficient rRNA to be   detected.    GCPCRT   2007     Negative for N. gonorrhoeae rRNA by transcription mediated amplification.   A negative result by transcription mediated amplification does not preclude the   presence of N. gonorrhoeae infection because results are dependent on proper   and adequate collection, absence of inhibitors, and sufficient rRNA to be   detected.    TREPAB Negative 2017    RPR Negative 2007    RUBELLAABIGG 38 2007    HGB 9.7 (L) 2017    HIV Negative 2007       GBS Status:   Lab Results   Component Value Date    GBS  10/17/2007     Negative: No GBS DNA detected, presumed negative for GBS or number of bacteria   may be below the limit of detection of the assay.   Assay performed on incubated broth culture of specimen using Cepheid   SmartCycler(R) real-time PCR.       Ultrasounds  1. 16 -- Single IUP with an MARTHA of 2017  2. 16 -- Single IUP, cephalic, posterior placenta, LINDA 17.7cm, growth 332g - appropriate   Abnormal findings: Absent nasal bone, Thick nuchal fold, Abnormal heart, Small stomach    Concern for trisomy 21   3. 3 -- BPP , LINDA 4.7cm, S:D 5.91    OB History  Obstetric History       T3      TAB0   SAB0   E0   M0   L3       # Outcome Date GA Lbr Alpesh/2nd Weight Sex Delivery Anes PTL Lv   4 Current            3 Term  37w0d  2.75 kg (6 lb 1 oz)     Y   2 Term 96 38w0d  2.977 kg (6 lb 9 oz) F    Y   1 Term 93 40w0d  4.252 kg (9 lb 6 oz) M    Y          PMHx:  Past Medical History   Diagnosis Date     Anemia      Anxiety      Migraine, unspecified, without mention of intractable migraine without mention of status migrainosus      Migraine     Postpartum depression      Type II or unspecified type diabetes mellitus without mention of complication, not stated as uncontrolled summer 2006     Diabetes mellitus     Uncomplicated asthma      with cold sx     PSHx:   Past Surgical History   Procedure Laterality Date     Cholecystectomy   "     Appendectomy       Meds:  Prescriptions Prior to Admission   Medication Sig Dispense Refill Last Dose     albuterol (PROAIR HFA/PROVENTIL HFA/VENTOLIN HFA) 108 (90 BASE) MCG/ACT Inhaler Inhale 2 puffs into the lungs as needed        sennosides (SENOKOT) 8.6 MG tablet Take 1 tablet by mouth 2 times daily 60 tablet 1 Past Month at Unknown time     sertraline (ZOLOFT) 50 MG tablet Take 1 tablet (50 mg) by mouth daily 60 tablet 0 3/7/2017 at Unknown time     ranitidine (ZANTAC) 150 MG tablet Take 1 tablet (150 mg) by mouth 2 times daily 60 tablet 1 Past Month at Unknown time     blood glucose monitoring (ACCU-CHEK LYNN SMARTVIEW) meter device kit Use to test blood sugar 4 times daily or as directed. 1 kit 0 Past Week at Unknown time     blood glucose monitoring (ACCU-CHEK FASTCLIX) lancets Use to test blood sugar 4 times daily or as directed. 1 Box 0 Past Week at Unknown time     buprenorphine HCl-naloxone HCl (SUBOXONE) 8-2 MG per film Place 1 Film under the tongue 2 times daily    3/7/2017 at Unknown time     insulin aspart (NOVOLOG FLEXPEN) 100 UNIT/ML injection Inject Subcutaneous 3 times daily (with meals)   3/7/2017 at Unknown time     insulin detemir (LEVEMIR FLEXPEN/FLEXTOUCH) 100 UNIT/ML injection Inject 30 Units Subcutaneous every morning    3/7/2017 at Unknown time     acetaminophen (TYLENOL) 325 MG tablet Take 2 tablets (650 mg) by mouth every 4 hours as needed for mild pain 100 tablet 0 Unknown at Unknown time     Allergies:    Allergies   Allergen Reactions     Codeine      Other reaction(s): Chest Pain     Compazine      Feels \"in another world\"     Droperidol      Would stare at people, couldn't speak     Propoxyphene      Tramadol Rash     Tylenol With Codeine #3 [Acetaminophen-Codeine] Hives     Vicodin [Hydrocodone-Acetaminophen] Hives     Morphine Itching and Anxiety     became jittery with most recent dose 1/15/ 08 2400      FmHx:   Family History   Problem Relation Age of Onset     Family " History Negative No family hx of      SocHx: History of polysubstance abuse (IV heroin, oxycodone, methamphetamines). Last used around October.     ROS:   Complete 10-point ROS negative except as noted in HPI. She denies headache, blurry vision, chest pain, shortness of breath, RUQ pain, nausea, vomiting, dysuria, hematuria or extremity edema.    PE:  Vit:   Patient Vitals for the past 4 hrs:   BP   17 1130 119/83      Gen: Well-appearing, NAD, comfortable laying in bed  CV: rrr, no mrg   Pulm: CTAB, no wheezes or crackles   Abd: Soft, gravid, non-tender, +BS   Ext: 2+ pitting LE edema b/l  Cx: /    Pres:  Vertex by BSUS  EFW:  5.5 lbs by susyds  Memb: Intact              FHT: Baseline 120s, moderate variability, present accelerations, absent decelerations   [place]: 1 contractions in 30 minutes        Recent Labs -- 3/7/17   HgbA1c 6.3%  Negative drug abuse panel   TSH 1.86 mIU/L  Hgb 11.4       Assessment  Ms. Cindy Fisher is a 41 year old , at 36w0d by 6w6d U/S, who presents for induction of labor for asymmetric IUGR with abnormal dopplers and oligohydramnios. Pregnancy is complicated by advanced maternal age, polysubstance abuse (on Suboxone), poorly controlled type 2 diabetes mellitus (on insulin), depression, oligohydramnios, asymmetric IUGR.     Plan  Admit to L&D  Labor: Anticipate IOL today. Given unfavorable cervix at this time, will use PO miso for cervical ripening.    - Pain management:  in room for patient support, undecided regarding pain management   - GBS unknown, swab pending.  PCN ordered. Will start PCN with cervical change.     FWB: Category 1 FHT.  Continue EFM and toco. Cephalic by BSUS.    - Suspect inlet VSD: Fetal ECHO within 24hrs in PP period   - NIPT +T21 with U/S findings concerning for T21 - will collect cord blood for karyotype + [facility]    - NICU present for delivery   - IUGR and oligohydramnios    PNC: Rh POS, Rubella Immune, GBS unknown - pending, Placenta  posterior    Fen/GI: Consistent carbohydrate diet, IVF     Type II DM: inconsistent regimen per patient.  Given concern for hypoglycemia if overdosing insulin, will place patient on insulin drip.  Start >110 BG. QID BG checks otherwise.     Hx of PSA: continue home suboxone dose, 8mg AM, 4mg PM    Depression: continue home zoloft    PAP with ASCUS - colposcopy in PP period      Michael Holland, MS3 acting as a scribe for Dr. Rodgers PGY2.  3/8/2017, 8:43 AM      I have edited the note above and agree. All edits and changes have been made by me.     The patient was discussed with Dr. Garnica who is in agreement with the treatment plan.    Irma Rodgers MD MPH  OB/GYN, PGY2  Pager: [phone]  3/8/2017  1:49 PM    [facility] staff:  Appreciate note by Dr. Rodgers.  I have seen and examined the patient without the resident. I have reviewed, edited, and agree with the note.      Mini Garnica MD, FACOG  3/8/2017  4:11 PM

## 2017-03-08 NOTE — PLAN OF CARE
Pt arrived for IOL of labor for fetal indications, as well as maternal dm, per pt. Vss. Afebrile. BG on admission 99. EFM and TOCO applied. Preferences discussed. Pt hopes to smudge the room. Pt oriented to room, pt bill of rights given. Plan for provider to eval and determine induction plan.
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
## 2017-03-09 LAB
GLUCOSE BLDC GLUCOMTR-MCNC: 100 MG/DL (ref 70–99)
GLUCOSE BLDC GLUCOMTR-MCNC: 106 MG/DL (ref 70–99)
GLUCOSE BLDC GLUCOMTR-MCNC: 86 MG/DL (ref 70–99)
GP B STREP DNA SPEC QL NAA+PROBE: NORMAL
SPECIMEN SOURCE: NORMAL

## 2017-03-09 PROCEDURE — 88233 TISSUE CULTURE SKIN/BIOPSY: CPT | Performed by: OBSTETRICS & GYNECOLOGY

## 2017-03-09 PROCEDURE — 88313 SPECIAL STAINS GROUP 2: CPT | Performed by: OBSTETRICS & GYNECOLOGY

## 2017-03-09 PROCEDURE — 25000125 ZZHC RX 250: Performed by: OBSTETRICS & GYNECOLOGY

## 2017-03-09 PROCEDURE — 25000132 ZZH RX MED GY IP 250 OP 250 PS 637: Performed by: OBSTETRICS & GYNECOLOGY

## 2017-03-09 PROCEDURE — 25800025 ZZH RX 258: Performed by: OBSTETRICS & GYNECOLOGY

## 2017-03-09 PROCEDURE — 25000125 ZZHC RX 250: Performed by: ANESTHESIOLOGY

## 2017-03-09 PROCEDURE — 12000030 ZZH R&B OB INTERMEDIATE UMMC

## 2017-03-09 PROCEDURE — 00000159 ZZHCL STATISTIC H-SEND OUTS PREP: Performed by: OBSTETRICS & GYNECOLOGY

## 2017-03-09 PROCEDURE — 00000146 ZZHCL STATISTIC GLUCOSE BY METER IP

## 2017-03-09 PROCEDURE — 40000977 ZZH STATISTIC ATTENDANCE AT DELIVERY

## 2017-03-09 PROCEDURE — 25000128 H RX IP 250 OP 636: Performed by: OBSTETRICS & GYNECOLOGY

## 2017-03-09 PROCEDURE — 88307 TISSUE EXAM BY PATHOLOGIST: CPT | Performed by: OBSTETRICS & GYNECOLOGY

## 2017-03-09 PROCEDURE — 72200001 ZZH LABOR CARE VAGINAL DELIVERY SINGLE

## 2017-03-09 PROCEDURE — 25000131 ZZH RX MED GY IP 250 OP 636 PS 637: Performed by: OBSTETRICS & GYNECOLOGY

## 2017-03-09 PROCEDURE — 88313 SPECIAL STAINS GROUP 2: CPT | Mod: 26 | Performed by: OBSTETRICS & GYNECOLOGY

## 2017-03-09 PROCEDURE — 88307 TISSUE EXAM BY PATHOLOGIST: CPT | Mod: 26 | Performed by: OBSTETRICS & GYNECOLOGY

## 2017-03-09 PROCEDURE — 00000458 ZZHCL STATISTIC INTERP CGEN PF  88291: Performed by: OBSTETRICS & GYNECOLOGY

## 2017-03-09 PROCEDURE — 10907ZC DRAINAGE OF AMNIOTIC FLUID, THERAPEUTIC FROM PRODUCTS OF CONCEPTION, VIA NATURAL OR ARTIFICIAL OPENING: ICD-10-PCS | Performed by: OBSTETRICS & GYNECOLOGY

## 2017-03-09 PROCEDURE — 88262 CHROMOSOME ANALYSIS 15-20: CPT | Performed by: OBSTETRICS & GYNECOLOGY

## 2017-03-09 RX ORDER — OXYTOCIN 10 [USP'U]/ML
10 INJECTION, SOLUTION INTRAMUSCULAR; INTRAVENOUS
Status: DISCONTINUED | OUTPATIENT
Start: 2017-03-09 | End: 2017-03-12 | Stop reason: HOSPADM

## 2017-03-09 RX ORDER — BISACODYL 10 MG
10 SUPPOSITORY, RECTAL RECTAL DAILY PRN
Status: DISCONTINUED | OUTPATIENT
Start: 2017-03-11 | End: 2017-03-12 | Stop reason: HOSPADM

## 2017-03-09 RX ORDER — NICOTINE POLACRILEX 4 MG
15-30 LOZENGE BUCCAL
Status: DISCONTINUED | OUTPATIENT
Start: 2017-03-09 | End: 2017-03-12 | Stop reason: HOSPADM

## 2017-03-09 RX ORDER — OXYTOCIN/0.9 % SODIUM CHLORIDE 30/500 ML
PLASTIC BAG, INJECTION (ML) INTRAVENOUS
Status: DISCONTINUED
Start: 2017-03-09 | End: 2017-03-09 | Stop reason: WASHOUT

## 2017-03-09 RX ORDER — FENTANYL CITRATE 50 UG/ML
100 INJECTION, SOLUTION INTRAMUSCULAR; INTRAVENOUS
Status: DISCONTINUED | OUTPATIENT
Start: 2017-03-09 | End: 2017-03-09

## 2017-03-09 RX ORDER — ALBUTEROL SULFATE 90 UG/1
2 AEROSOL, METERED RESPIRATORY (INHALATION)
Status: DISCONTINUED | OUTPATIENT
Start: 2017-03-09 | End: 2017-03-12 | Stop reason: HOSPADM

## 2017-03-09 RX ORDER — OXYTOCIN/0.9 % SODIUM CHLORIDE 30/500 ML
100 PLASTIC BAG, INJECTION (ML) INTRAVENOUS CONTINUOUS
Status: DISCONTINUED | OUTPATIENT
Start: 2017-03-09 | End: 2017-03-12 | Stop reason: HOSPADM

## 2017-03-09 RX ORDER — NALBUPHINE HYDROCHLORIDE 10 MG/ML
2.5-5 INJECTION, SOLUTION INTRAMUSCULAR; INTRAVENOUS; SUBCUTANEOUS EVERY 6 HOURS PRN
Status: DISCONTINUED | OUTPATIENT
Start: 2017-03-09 | End: 2017-03-09

## 2017-03-09 RX ORDER — NALOXONE HYDROCHLORIDE 0.4 MG/ML
.1-.4 INJECTION, SOLUTION INTRAMUSCULAR; INTRAVENOUS; SUBCUTANEOUS
Status: DISCONTINUED | OUTPATIENT
Start: 2017-03-09 | End: 2017-03-12 | Stop reason: HOSPADM

## 2017-03-09 RX ORDER — OXYTOCIN/0.9 % SODIUM CHLORIDE 30/500 ML
1-24 PLASTIC BAG, INJECTION (ML) INTRAVENOUS CONTINUOUS
Status: DISCONTINUED | OUTPATIENT
Start: 2017-03-09 | End: 2017-03-09

## 2017-03-09 RX ORDER — DEXTROSE MONOHYDRATE 25 G/50ML
25-50 INJECTION, SOLUTION INTRAVENOUS
Status: DISCONTINUED | OUTPATIENT
Start: 2017-03-09 | End: 2017-03-12 | Stop reason: HOSPADM

## 2017-03-09 RX ORDER — NALOXONE HYDROCHLORIDE 0.4 MG/ML
.1-.4 INJECTION, SOLUTION INTRAMUSCULAR; INTRAVENOUS; SUBCUTANEOUS
Status: DISCONTINUED | OUTPATIENT
Start: 2017-03-09 | End: 2017-03-09

## 2017-03-09 RX ORDER — EPHEDRINE SULFATE 50 MG/ML
5 INJECTION, SOLUTION INTRAMUSCULAR; INTRAVENOUS; SUBCUTANEOUS
Status: DISCONTINUED | OUTPATIENT
Start: 2017-03-09 | End: 2017-03-09

## 2017-03-09 RX ORDER — MISOPROSTOL 200 UG/1
400 TABLET ORAL
Status: DISCONTINUED | OUTPATIENT
Start: 2017-03-09 | End: 2017-03-12 | Stop reason: HOSPADM

## 2017-03-09 RX ORDER — TERBUTALINE SULFATE 1 MG/ML
0.25 INJECTION, SOLUTION SUBCUTANEOUS
Status: DISCONTINUED | OUTPATIENT
Start: 2017-03-09 | End: 2017-03-09

## 2017-03-09 RX ORDER — OXYTOCIN/0.9 % SODIUM CHLORIDE 30/500 ML
340 PLASTIC BAG, INJECTION (ML) INTRAVENOUS CONTINUOUS PRN
Status: DISCONTINUED | OUTPATIENT
Start: 2017-03-09 | End: 2017-03-12 | Stop reason: HOSPADM

## 2017-03-09 RX ORDER — SODIUM CHLORIDE, SODIUM LACTATE, POTASSIUM CHLORIDE, CALCIUM CHLORIDE 600; 310; 30; 20 MG/100ML; MG/100ML; MG/100ML; MG/100ML
INJECTION, SOLUTION INTRAVENOUS CONTINUOUS
Status: DISCONTINUED | OUTPATIENT
Start: 2017-03-09 | End: 2017-03-09

## 2017-03-09 RX ORDER — IBUPROFEN 400 MG/1
400-800 TABLET, FILM COATED ORAL EVERY 6 HOURS PRN
Status: DISCONTINUED | OUTPATIENT
Start: 2017-03-09 | End: 2017-03-12 | Stop reason: HOSPADM

## 2017-03-09 RX ORDER — AMOXICILLIN 250 MG
1-2 CAPSULE ORAL 2 TIMES DAILY
Status: DISCONTINUED | OUTPATIENT
Start: 2017-03-09 | End: 2017-03-12 | Stop reason: HOSPADM

## 2017-03-09 RX ORDER — ACETAMINOPHEN 325 MG/1
650 TABLET ORAL EVERY 4 HOURS PRN
Status: DISCONTINUED | OUTPATIENT
Start: 2017-03-09 | End: 2017-03-12 | Stop reason: HOSPADM

## 2017-03-09 RX ORDER — ONDANSETRON 4 MG/1
4 TABLET, FILM COATED ORAL EVERY 6 HOURS PRN
Status: DISCONTINUED | OUTPATIENT
Start: 2017-03-09 | End: 2017-03-12 | Stop reason: HOSPADM

## 2017-03-09 RX ORDER — LANOLIN 100 %
OINTMENT (GRAM) TOPICAL
Status: DISCONTINUED | OUTPATIENT
Start: 2017-03-09 | End: 2017-03-12 | Stop reason: HOSPADM

## 2017-03-09 RX ORDER — HYDROCORTISONE 2.5 %
CREAM (GRAM) TOPICAL 3 TIMES DAILY PRN
Status: DISCONTINUED | OUTPATIENT
Start: 2017-03-09 | End: 2017-03-12 | Stop reason: HOSPADM

## 2017-03-09 RX ORDER — OXYTOCIN 10 [USP'U]/ML
INJECTION, SOLUTION INTRAMUSCULAR; INTRAVENOUS
Status: DISCONTINUED
Start: 2017-03-09 | End: 2017-03-09 | Stop reason: WASHOUT

## 2017-03-09 RX ORDER — LIDOCAINE 40 MG/G
CREAM TOPICAL
Status: DISCONTINUED | OUTPATIENT
Start: 2017-03-09 | End: 2017-03-09

## 2017-03-09 RX ADMIN — PENICILLIN G POTASSIUM 5 MILLION UNITS: 5000000 POWDER, FOR SOLUTION INTRAMUSCULAR; INTRAPLEURAL; INTRATHECAL; INTRAVENOUS at 11:30

## 2017-03-09 RX ADMIN — BUPRENORPHINE HYDROCHLORIDE, NALOXONE HYDROCHLORIDE 1 FILM: 8; 2 FILM, SOLUBLE BUCCAL; SUBLINGUAL at 08:19

## 2017-03-09 RX ADMIN — SODIUM CHLORIDE, POTASSIUM CHLORIDE, SODIUM LACTATE AND CALCIUM CHLORIDE: 600; 310; 30; 20 INJECTION, SOLUTION INTRAVENOUS at 01:36

## 2017-03-09 RX ADMIN — Medication 10 ML/HR: at 11:32

## 2017-03-09 RX ADMIN — ACETAMINOPHEN 650 MG: 325 TABLET, FILM COATED ORAL at 23:32

## 2017-03-09 RX ADMIN — SENNOSIDES A AND B 1 TABLET: 8.6 TABLET, FILM COATED ORAL at 08:21

## 2017-03-09 RX ADMIN — RANITIDINE 150 MG: 150 TABLET ORAL at 23:32

## 2017-03-09 RX ADMIN — SODIUM CHLORIDE, POTASSIUM CHLORIDE, SODIUM LACTATE AND CALCIUM CHLORIDE: 600; 310; 30; 20 INJECTION, SOLUTION INTRAVENOUS at 09:41

## 2017-03-09 RX ADMIN — ONDANSETRON HYDROCHLORIDE 4 MG: 4 TABLET, FILM COATED ORAL at 16:01

## 2017-03-09 RX ADMIN — BUPRENORPHINE HYDROCHLORIDE, NALOXONE HYDROCHLORIDE 1 FILM: 4; 1 FILM, SOLUBLE BUCCAL; SUBLINGUAL at 20:22

## 2017-03-09 RX ADMIN — IBUPROFEN 800 MG: 400 TABLET ORAL at 20:22

## 2017-03-09 RX ADMIN — FENTANYL CITRATE 100 MCG: 50 INJECTION, SOLUTION INTRAMUSCULAR; INTRAVENOUS at 10:21

## 2017-03-09 RX ADMIN — SENNOSIDES AND DOCUSATE SODIUM 1 TABLET: 8.6; 5 TABLET ORAL at 20:22

## 2017-03-09 RX ADMIN — OXYTOCIN-SODIUM CHLORIDE 0.9% IV SOLN 30 UNIT/500ML 1 MILLI-UNITS/MIN: 30-0.9/5 SOLUTION at 01:33

## 2017-03-09 RX ADMIN — SERTRALINE HYDROCHLORIDE 50 MG: 50 TABLET ORAL at 08:21

## 2017-03-09 RX ADMIN — RANITIDINE 150 MG: 150 TABLET ORAL at 08:21

## 2017-03-09 RX ADMIN — Medication 10 ML/HR: at 11:15

## 2017-03-09 RX ADMIN — METOCLOPRAMIDE 10 MG: 5 INJECTION, SOLUTION INTRAMUSCULAR; INTRAVENOUS at 04:46

## 2017-03-09 RX ADMIN — METOCLOPRAMIDE 10 MG: 5 INJECTION, SOLUTION INTRAMUSCULAR; INTRAVENOUS at 09:15

## 2017-03-09 ASSESSMENT — ENCOUNTER SYMPTOMS: SEIZURES: 0

## 2017-03-09 NOTE — PLAN OF CARE
Problem: Goal Outcome Summary  Goal: Goal Outcome Summary  Outcome: No Change  Seems very uncomfortable with reg. Uterine activity per toco.  Declines interventions for pain.  Cvx unchanged per Dr. Rodgers.   Suboxone given and she immediately vomited small amt (approx 50 ml.) mucous, followed by retching for next  10 minutes. Zofran given.   Requested another dose.  Md informed.

## 2017-03-09 NOTE — PLAN OF CARE
Problem: Postpartum, Vaginal Delivery (Adult)  Goal: Signs and Symptoms of Listed Potential Problems Will be Absent or Manageable (Postpartum, Vaginal Delivery)  Signs and symptoms of listed potential problems will be absent or manageable by discharge/transition of care (reference Postpartum, Vaginal Delivery (Adult) CPG).  Data: Cindy Fisher transferred to [id number] via wheelchair at 1445 after stop in NICU.   Action: Receiving unit notified of transfer: Yes. Patient and family notified of room change. Report given to Amal at 1525. Belongings sent to receiving unit. Accompanied by Registered Nurse. Oriented patient to surroundings. Call light within reach. ID bands double-checked with receiving RN.  Response: Patient tolerated transfer and is stable.

## 2017-03-09 NOTE — ANESTHESIA PROCEDURE NOTES
Epidural Procedure Note    Staff:     Anesthesiologist:  JULIO STEVENS    Resident/CRNA:  JUWAN JOSEPH    Procedure performed by resident/CRNA in the presence of a teaching physician    Location: OB     Procedure start time:  3/9/2017 11:00 AM     Procedure end time:  3/9/2017 11:15 AM   Pre-procedure checklist:   patient identified, IV checked, site marked, risks and benefits discussed, informed consent, monitors and equipment checked, pre-op evaluation, at physician/surgeon's request and post-op pain management      Correct Patient: Yes      Correct Position: Yes      Correct Site: Yes      Correct Procedure: Yes      Correct Laterality:  Yes    Site Marked:  Yes  Procedure:     Procedure:  Epidural catheter    Position:  Sitting    Sterile Prep: chloraprep, mask, sterile gloves and patient draped      Insertion site:  L3-4    Local skin infiltration:  1% lidocaine    amount (mL):  2    Approach:  Midline    Needle gauge (G):  17    Needle Length (in):  3.5    Block Needle Type:  Touhy    Injection Technique:  LORT saline    NENA at (cm):  6.5    Attempts:  1    Redirects:  0    Catheter gauge (G):  19    Catheter threaded easily: Yes      Threaded to cm at skin:  11    Threaded in epidural space (cm):  4.5    Paresthesias:  No    Aspiration negative for Heme or CSF: Yes      Test dose (mL):  5    Test dose time:  11:12    Test dose negative for signs of intravascular, subdural or intrathecal injection: Yes

## 2017-03-09 NOTE — PLAN OF CARE
"Problem: Labor (Cervical Ripen, Induct, Augment) (Adult,Obstetrics,Pediatric)  Goal: Signs and Symptoms of Listed Potential Problems Will be Absent or Manageable (Labor)  Signs and symptoms of listed potential problems will be absent or manageable by discharge/transition of care (reference Labor (Cervical Ripen, Induct, Augment) (Adult,Obstetrics,Pediatric) CPG).   Outcome: Therapy, progress towards functional goals is fair  Patient able to sleep with morphine and benadryl. States can feel contractions but they are \"ok\". Bloody show present, but no leaking fluid. EFM as charted; VSS. Anticipate .      "

## 2017-03-09 NOTE — PLAN OF CARE
Problem: Labor (Cervical Ripen, Induct, Augment) (Adult,Obstetrics,Pediatric)  Goal: Signs and Symptoms of Listed Potential Problems Will be Absent or Manageable (Labor)  Signs and symptoms of listed potential problems will be absent or manageable by discharge/transition of care (reference Labor (Cervical Ripen, Induct, Augment) (Adult,Obstetrics,Pediatric) CPG).   Outcome: Completed Date Met:  17  Vaginal Delivery Note   of viable Female with Dr Rodgers and Dr Rojas in attendance.  NICU present.  Infant with spontaneous cry, briefly to mother's abdomen, then to NICU  APGAR at 1 minute: 8 and APGAR at 5 minutes: 9.  Placenta delivered with out complication, NICU nikki needed blood samples from placenta. Breast feeding initiated prior to baby transfer to NICU. No laceration, no repair, hailey cares provided.  Mother in stable condition.

## 2017-03-09 NOTE — PROGRESS NOTES
Brief Labor Progress Note      S: Feeling more contraction pain and had some nausea and vomiting around 0900.  She was planning on smudging to help with pain, but now with epidural which is helping some, but feeling pain on       O:  Vitals:    17 0655 17 0730 17 0800 17 0829   BP: 112/74  120/75 113/71   Resp:       Temp:  98.4  F (36.9  C)     TempSrc:  Oral           Cervix: 6/90/-2  Membranes: intact    FHR Baseline: 140s bpm  Variability: Moderate  Accels: present  Decels: intermittent spontaneous variable decel present  [place]: contractions 3-4/10min    A/P:  Ms. Cindy Fisher is a 41 year old , at 36w1d by 6w6d U/S, who presents for induction of labor for asymmetric IUGR with abnormal dopplers and oligohydramnios. Pregnancy is complicated by advanced maternal age, polysubstance abuse (on Suboxone), poorly controlled type 2 diabetes mellitus (on insulin), depression, oligohydramnios, asymmetric IUGR.      Labor: s/p miso at 1130 and cook catheter. Now 6cm. Continue to monitor for labor.      FWB:  - Cat I, reactive  - Continuous fetal monitoring  - Suspect inlet VSD: Fetal ECHO within 24hrs in PP period  - NIPT +T21 with U/S findings concerning for T21 - will collect cord blood for karyotype + CGH   - NICU present for delivery  - IUGR and oligohydramnios     PNC: Rh POS, Rubella Immune, GBS unknown - pending, Placenta posterior     Type II DM: inconsistent regimen per patient. Given concern for hypoglycemia if overdosing insulin, will place patient on insulin drip. Start >110 BG. QID BG checks otherwise.       Hx of PSA: continue home suboxone dose, 8mg AM, 4mg PM       Depression: continue home zoloft    Staffed with Dr. Rojas.       Irma Rodgers MD MPH  OB-GYN PGY-2  17  12:01 PM

## 2017-03-09 NOTE — PLAN OF CARE
Problem: Goal Outcome Summary  Goal: Goal Outcome Summary  Outcome: No Change  Continues to ctx every 2-7min, that palpate mild, but pt moans thru. Pt declines intervention. FHR category 1. Cervix remains unchanged. Cook cath inserted at 2235. Nausea continues despite zofran given earlier. Order received for reglan, which pt uses at home. Nausea has been a problem throughout pregnancy. Pt has refused meds, including suboxone, will give when pt able to tolerate. BG stable at 74 at HS.

## 2017-03-09 NOTE — ANESTHESIA PREPROCEDURE EVALUATION
Anesthesia Evaluation     .        ROS/MED HX    ENT/Pulmonary:     (+)asthma , . .   (-) recent URI   Neurologic:      (-) seizures   Cardiovascular:        (-) PIH   METS/Exercise Tolerance:     Hematologic:         Musculoskeletal:         GI/Hepatic:        (-) GERD and hepatitis   Renal/Genitourinary:         Endo:         Psychiatric:         Infectious Disease:         Malignancy:         Other:               Physical Exam  Normal systems: cardiovascular, pulmonary and dental    Airway   Mallampati: II  TM distance: < 3 FB  Neck ROM: full  Mouth opening: > 3 cm    Dental     Cardiovascular       Pulmonary     Other findings: DMII  Polysubstance abuse on suboxone                 Anesthesia Plan      History & Physical Review      ASA Status:  .  OB Epidural Asa: 2       Plan for     Informed consent obtained.  All risks and benefits of the epidural discussed with the patient.  All questions answered and all parties agreed with the plan.         Postoperative Care      Consents  Anesthetic plan, risks, benefits and alternatives discussed with:  Patient..

## 2017-03-09 NOTE — PROVIDER NOTIFICATION
03/08/17 1824   Provider Notification   Provider Name/Title Dr. Romero   Method of Notification Electronic Page   Request Evaluate - Remote   Notification Reason (Pt vomited immediately after Suboxone dose.)   Pt. Feels dose should be repeated. Pharmacy advised to check with MD

## 2017-03-09 NOTE — L&D DELIVERY NOTE
Delivery Summary    Ms. Cindy Fisher is a 41 year old now  at 36w1d, admitted for induction of labor for asymmetric IUGR, abnormal dopplers and oligohydramnios.  Pregnancy was complicated by: Type II DM, IUGR, oligohydramnios, abnormal dopplers, polysubstance abuse on suboxone, Depression on zoloft, AMA, PAP with ASCUS, fetus with VSD and increase Trisomy 21 risk and abnormal U/S findings suggestive of trisomy 21, unknown HIV status with FOB with AIDS. She underwent induction of labor with misoprostol x1 and began to spontaneously contract following medication.  A cook balloon was placed and fell out overnight.  She was started on IV Pitocin and later progressed rapidly to complete.  Given Cat II FHT with recurrent decelerations during rapid dilation of cervix, AROM was performed and a FSE was placed.  The infant delivered shortly after being confirmed complete. Infant delivered in OA position. NICU was present. Shoulders delivered easily. No nuchal cord. Infant with spontaneous cry and handed over to NICU team to assess. APGARS 8 and 9 at 1min and 5 mins respectively. Placed on mother's chest following NICU assessment.  Facial features consistent with Trisomy 21.  At time of birth, GBS returned negative.  Rapid HIV prior to onset of active labor was negative.  Placenta delivered with gentle cord traction; noted to be intact with 3 vessel cord. No perineal or vaginal lacerations noted. Fundus firm and lochia minimal at the end of the case. 20 U IV Pitocin given. EBL 50 cc.    Medications administered in labor:  Pain Rx Epidural; Antibiotics No  Infant: vigorous female infant with apgars of 8 and 9.  Nuchal cord: not present  Amnionitic fluid: clear    Infant weight: 2440g  Perineum: Intact  Placenta: spontaneous, intact,  with a 3 vessel cord. IV oxytocin was given.  EBL 50mL, QBL not recorded  In attendance:  - Dr. Bob MD - Staff    - Irma Rodgers PGY-2  Complications of pregnancy, labor and  delivery: None    Irma Rodgers MD MPH  OB/GYN, PGY-2  3/9/2017, 12:49 PM    Delivery Summary    Cindy Fisher MRN# [id number]   Age: 41 year old YOB: 1975     ASSESSMENT & PLAN:         Labor Event Times    Labor onset date:  3/9/17 Onset time:  10:00 AM   Dilation complete date:  3/9/17 Complete time:  12:11 PM   Start pushing date/time:  3/9/2017 1211            Labor Length    1st Stage (hrs):  2 (min):  11   2nd Stage (hrs):  0 (min):  1   3rd Stage (hrs):  0 (min):  8      Labor Events     labor?:  No    steroids:  None   Labor Type:  Induction      Antibiotics received during labor?:  No      Rupture identifier:  Rupture 1   Rupture date/time:     Rupture type:  Artificial Rupture of Membranes   Fluid color:  Clear   Fluid odor:  Normal      Induction:  Misoprostol, Mechanical ripening agent   Induction date/time:     Cervical ripening date/time:        1:1 continuous labor support provided by?:  JUDITH avilez          Delivery/Placenta Date and Time    Delivery Date:  3/9/17 Delivery Time:  12:12 PM   Placenta Date/Time:  3/9/2017 12:20 PM   Oxytocin given at the time of delivery:  after delivery of placenta      Vaginal Counts    Initial count performed by 2 team members:   Two Team Members   MD Steffanie Vicente RN          Needles Suture [place] [employer]   Initial counts 2 0 5    Added to count 0 0 0    Final counts 2 0 5       Placed during labor Accounted for at the end of labor   Yes Yes   NA NA   NA NA      Final count performed by 2 team members:   Two Team Members   MD Steffanie Vicente RN         Final count correct?:  Yes         Apgars    Living status:  Yes    1 Minute 5 Minute 10 Minute 15 Minute 20 Minute   Skin color: 0  1       Heart rate: 2  2       Reflex irritability: 2  2       Muscle tone: 2  2       Respiratory effort: 2  2       Total: 8  9          Apgars assigned by:  ERIC WAYNE      Cord    Vessels:  3  Vessels Complications:  None   Cord Blood Disposition:  Lab Gases Sent?:  Yes         [place] Resuscitation    Methods:  Oximetry      [place] Care at Delivery:  NN Delivery Attendance Note:  NICU team was asked by Dr. Rodgers to attend the delivery of this late , female infant with a gestational age of 36 1/7 weeks secondary to prenatal diagnosis of trisomy 21 and VSD.   Infant delivered vaginally, clamping of the umbilical cord occurred shortly after birth. She had spontaneous respirations and a strong cry at birth. She was brought to a preheated warmer, and was dried and stimulated. Brief exam is WNL except for acrocyanosis and facial features consistent with trisomy 21. She was shown to mother and placed skin to skin before transferring to the NICU for further care.  DARLIN Ahn, [facility]     3/9/2017, 1:40 PM   Output in Delivery Room:  Voided       Measurements    Weight:  86.1 oz       Labor Events and Shoulder Dystocia    Fetal Tracing Prior to Delivery:  Category 2   Fetal Tracing Comments:  decelerations to the 90s given rapid dilation and fetal head descent   Shoulder dystocia present?:  Neg            Delivery (Maternal) (Provider to Complete) ([id number])    Episiotomy:  None   Perineal lacerations:  None    Vaginal delivery est. blood loss (mL):  50         Mother's Information  Mother: Cindy Fisher MICA #[id number]    Start of Mother's Information     IO Blood Loss  17 1000 - 17 2048    Mom's I/O Activity            End of Mother's Information  Mother: Cindy Fisher MICA #[id number]            Delivery - Provider to Complete ([id number])    Attempted Delivery Types (Choose all that apply):  Spontaneous Vaginal Delivery   Delivery Type (Choose the 1 that will go to the Birth History):  Vaginal, Spontaneous Delivery                           Placenta    Date/Time:  3/9/2017 12:20 PM   Removal:  Expressed   Disposition:  Pathology      Anesthesia    Method:  Epidural          Presentation and Position    Presentation:  Vertex   Position:  Middle Occiput Anterior                    Irma Rani Rodgers MD     I was present for delivery   Caryn Rojas

## 2017-03-09 NOTE — CONSULTS
Inpatient Diabetes Note 3/9/2017  Received consult  Agree with low correction scale before meals and HS  Full consult 3/10, by Yesica Leonard, KALI  Pager [phone]  Job code pager [phone]

## 2017-03-09 NOTE — PROGRESS NOTES
[facility]  Labor Progress Note    S: Patient still nauseous and vomiting. Gave reglan which she takes at home. Feeling contractions every couple of minutes.    O:   Patient Vitals for the past 4 hrs:   BP Temp Temp src Resp   17 2130 139/71 98.2  F (36.8  C) Oral 20   17 1916 140/74 98.1  F (36.7  C) Oral 18     SVE: /-3. Cook catheter placed with 50/50 ml in each balloon. Moderate amount of bloody show noted    FHT: Baseline 115, moderate variability, + accelerations, no decelerations  [place]: 3 contractions in 10 minutes    A/P: Cindy Fisher is a 41 year old  at 36w0d by   6w6d U/S, admitted for IOL for asymmetric IUGR with abnormal dopplers and oligohydramnios. Pregnancy is complicated by advanced maternal age, polysubstance abuse (on Suboxone), poorly controlled type 2 diabetes mellitus (on insulin), depression, oligohydramnios, asymmetric IUGR.      Labor: s/p miso at 1130 today. alonso well, placed Cook catheter with 50/50 at 2245. Will deflate vaginal balloon after 12 hours and continue traction on ballon.  - Working towards , but will assess fetal tolerance of labor     FWB:  - Cat I, reactive  - Continuous fetal monitoring  - Suspect inlet VSD: Fetal ECHO within 24hrs in PP period  - NIPT +T21 with U/S findings concerning for T21 - will collect cord blood for karyotype + [facility]   - NICU present for delivery  - IUGR and oligohydramnios     PNC: Rh Pos, Rubella Immune, GBS unknown - pending, Placenta posterior     Type II DM: inconsistent regimen per patient. Given concern for hypoglycemia if overdosing insulin, will place patient on insulin drip. Start >110 BG. QID BG checks otherwise.       Hx of PSA: continue home suboxone dose, 8mg AM, 4mg PM      Depression: continue home zoloft    Kimberley Hall MD  Ob/Gyn, PGY-3  3/8/2017, 10:44 PM

## 2017-03-09 NOTE — PROVIDER NOTIFICATION
03/09/17 0630   Provider Notification   Provider Name/Title Dr Romero   Method of Notification Electronic Page   Notification Reason Other (Comment)     Provider notified that cook catheter came out when patient up to bathroom at 0620.

## 2017-03-09 NOTE — PROGRESS NOTES
Progress Note - Patient given sleep meds, resting    Vitals:    17 2330 17 0030 17 0135 17 0225   BP: 132/75 114/76 121/82 126/86   Resp: 20      Temp: 98.1  F (36.7  C)      TempSrc: Oral        FHR: Baseline 120, mod variability, accels present, rare late decels  Contractions: Difficulty monitoring but appear Q6 minutes    A/P: Cindy Fisher is a 41 year old  at 36w0d by 6w6d U/S, admitted for IOL for asymmetric IUGR with abnormal dopplers and oligohydramnios. Pregnancy is complicated by advanced maternal age, polysubstance abuse (on Suboxone), poorly controlled type 2 diabetes mellitus (on insulin), depression, oligohydramnios, asymmetric IUGR.       Labor: s/p miso at 1130 today. alonso well, placed Cook catheter with 50/50 at 2245. Will deflate vaginal balloon after 12 hours and continue traction on ballon.  - Morphine and Benadryl (vs vistaril as patient gets itching with morphine) overnight  - Contraction spaced overnight, Pit started at 0133.  - Working towards , but will assess fetal tolerance of labor      FWB:  - Cat I, reactive  - Continuous fetal monitoring  - Suspect inlet VSD: Fetal ECHO within 24hrs in PP period  - NIPT +T21 with U/S findings concerning for T21 - will collect cord blood for karyotype + [facility]   - NICU present for delivery  - IUGR and oligohydramnios      PNC: Rh Pos, Rubella Immune, GBS unknown - pending, Placenta posterior      Type II DM: inconsistent regimen per patient. Given concern for hypoglycemia if overdosing insulin, will place patient on insulin drip. Start >110 BG. QID BG checks otherwise.       Hx of PSA: continue home suboxone dose, 8mg AM, 4mg PM      Depression: continue home zoloft    Chantelle Romero MD  OB/GYN PGY2

## 2017-03-09 NOTE — PROVIDER NOTIFICATION
03/09/17 1534   Provider Notification   Provider Name/Title resident G 2    Method of Notification Phone   Request Evaluate-Remote   Notification Reason Medication Request

## 2017-03-10 ENCOUNTER — ANESTHESIA EVENT (OUTPATIENT)
Dept: SURGERY | Facility: CLINIC | Age: 42
End: 2017-03-10
Payer: COMMERCIAL

## 2017-03-10 ENCOUNTER — ANESTHESIA (OUTPATIENT)
Dept: OBSTETRICS | Facility: CLINIC | Age: 42
End: 2017-03-10
Payer: COMMERCIAL

## 2017-03-10 ENCOUNTER — ANESTHESIA (OUTPATIENT)
Dept: SURGERY | Facility: CLINIC | Age: 42
End: 2017-03-10
Payer: COMMERCIAL

## 2017-03-10 ENCOUNTER — ANESTHESIA EVENT (OUTPATIENT)
Dept: OBSTETRICS | Facility: CLINIC | Age: 42
End: 2017-03-10
Payer: COMMERCIAL

## 2017-03-10 LAB
ANION GAP SERPL CALCULATED.3IONS-SCNC: 6 MMOL/L (ref 3–14)
BUN SERPL-MCNC: 10 MG/DL (ref 7–30)
CALCIUM SERPL-MCNC: 7.6 MG/DL (ref 8.5–10.1)
CHLORIDE SERPL-SCNC: 111 MMOL/L (ref 94–109)
CO2 SERPL-SCNC: 25 MMOL/L (ref 20–32)
COPATH REPORT: NORMAL
COPATH REPORT: NORMAL
CREAT SERPL-MCNC: 0.63 MG/DL (ref 0.52–1.04)
ERYTHROCYTE [DISTWIDTH] IN BLOOD BY AUTOMATED COUNT: 14.2 % (ref 10–15)
GFR SERPL CREATININE-BSD FRML MDRD: ABNORMAL ML/MIN/1.7M2
GLUCOSE BLDC GLUCOMTR-MCNC: 100 MG/DL (ref 70–99)
GLUCOSE BLDC GLUCOMTR-MCNC: 62 MG/DL (ref 70–99)
GLUCOSE BLDC GLUCOMTR-MCNC: 72 MG/DL (ref 70–99)
GLUCOSE BLDC GLUCOMTR-MCNC: 74 MG/DL (ref 70–99)
GLUCOSE BLDC GLUCOMTR-MCNC: 74 MG/DL (ref 70–99)
GLUCOSE BLDC GLUCOMTR-MCNC: 75 MG/DL (ref 70–99)
GLUCOSE BLDC GLUCOMTR-MCNC: 76 MG/DL (ref 70–99)
GLUCOSE BLDC GLUCOMTR-MCNC: 76 MG/DL (ref 70–99)
GLUCOSE BLDC GLUCOMTR-MCNC: 77 MG/DL (ref 70–99)
GLUCOSE BLDC GLUCOMTR-MCNC: 79 MG/DL (ref 70–99)
GLUCOSE BLDC GLUCOMTR-MCNC: 80 MG/DL (ref 70–99)
GLUCOSE BLDC GLUCOMTR-MCNC: 82 MG/DL (ref 70–99)
GLUCOSE BLDC GLUCOMTR-MCNC: 83 MG/DL (ref 70–99)
GLUCOSE BLDC GLUCOMTR-MCNC: 85 MG/DL (ref 70–99)
GLUCOSE BLDC GLUCOMTR-MCNC: 88 MG/DL (ref 70–99)
GLUCOSE BLDC GLUCOMTR-MCNC: 93 MG/DL (ref 70–99)
GLUCOSE BLDC GLUCOMTR-MCNC: 94 MG/DL (ref 70–99)
GLUCOSE SERPL-MCNC: 84 MG/DL (ref 70–99)
HCT VFR BLD AUTO: 27.7 % (ref 35–47)
HGB BLD-MCNC: 8.7 G/DL (ref 11.7–15.7)
HGB BLD-MCNC: 9.4 G/DL (ref 11.7–15.7)
HIV1 RNA # PLAS NAA DL=20: NORMAL {COPIES}/ML
HIV1 RNA SERPL NAA+PROBE-LOG#: NORMAL {LOG_COPIES}/ML
MCH RBC QN AUTO: 25.3 PG (ref 26.5–33)
MCHC RBC AUTO-ENTMCNC: 31.4 G/DL (ref 31.5–36.5)
MCV RBC AUTO: 81 FL (ref 78–100)
PLATELET # BLD AUTO: 273 10E9/L (ref 150–450)
POTASSIUM SERPL-SCNC: 4.2 MMOL/L (ref 3.4–5.3)
RBC # BLD AUTO: 3.44 10E12/L (ref 3.8–5.2)
SODIUM SERPL-SCNC: 142 MMOL/L (ref 133–144)
WBC # BLD AUTO: 8.5 10E9/L (ref 4–11)

## 2017-03-10 PROCEDURE — 93005 ELECTROCARDIOGRAM TRACING: CPT

## 2017-03-10 PROCEDURE — 27210794 ZZH OR GENERAL SUPPLY STERILE: Performed by: OBSTETRICS & GYNECOLOGY

## 2017-03-10 PROCEDURE — 93010 ELECTROCARDIOGRAM REPORT: CPT | Performed by: INTERNAL MEDICINE

## 2017-03-10 PROCEDURE — 25000131 ZZH RX MED GY IP 250 OP 636 PS 637: Performed by: OBSTETRICS & GYNECOLOGY

## 2017-03-10 PROCEDURE — 40000671 ZZH STATISTIC ANESTHESIA CASE

## 2017-03-10 PROCEDURE — 25000125 ZZHC RX 250: Performed by: NURSE ANESTHETIST, CERTIFIED REGISTERED

## 2017-03-10 PROCEDURE — 00000146 ZZHCL STATISTIC GLUCOSE BY METER IP

## 2017-03-10 PROCEDURE — 25000128 H RX IP 250 OP 636: Performed by: NURSE ANESTHETIST, CERTIFIED REGISTERED

## 2017-03-10 PROCEDURE — 36415 COLL VENOUS BLD VENIPUNCTURE: CPT | Performed by: OBSTETRICS & GYNECOLOGY

## 2017-03-10 PROCEDURE — 37000009 ZZH ANESTHESIA TECHNICAL FEE, EACH ADDTL 15 MIN: Performed by: OBSTETRICS & GYNECOLOGY

## 2017-03-10 PROCEDURE — 36000051 ZZH SURGERY LEVEL 2 1ST 30 MIN - UMMC: Performed by: OBSTETRICS & GYNECOLOGY

## 2017-03-10 PROCEDURE — 25000132 ZZH RX MED GY IP 250 OP 250 PS 637: Performed by: OBSTETRICS & GYNECOLOGY

## 2017-03-10 PROCEDURE — 25000566 ZZH SEVOFLURANE, EA 15 MIN: Performed by: OBSTETRICS & GYNECOLOGY

## 2017-03-10 PROCEDURE — S0020 INJECTION, BUPIVICAINE HYDRO: HCPCS | Performed by: OBSTETRICS & GYNECOLOGY

## 2017-03-10 PROCEDURE — 40000171 ZZH STATISTIC PRE-PROCEDURE ASSESSMENT III: Performed by: OBSTETRICS & GYNECOLOGY

## 2017-03-10 PROCEDURE — 80048 BASIC METABOLIC PNL TOTAL CA: CPT | Performed by: OBSTETRICS & GYNECOLOGY

## 2017-03-10 PROCEDURE — 0UB70ZZ EXCISION OF BILATERAL FALLOPIAN TUBES, OPEN APPROACH: ICD-10-PCS | Performed by: OBSTETRICS & GYNECOLOGY

## 2017-03-10 PROCEDURE — 25000125 ZZHC RX 250: Performed by: OBSTETRICS & GYNECOLOGY

## 2017-03-10 PROCEDURE — 25000128 H RX IP 250 OP 636: Performed by: STUDENT IN AN ORGANIZED HEALTH CARE EDUCATION/TRAINING PROGRAM

## 2017-03-10 PROCEDURE — 12000030 ZZH R&B OB INTERMEDIATE UMMC

## 2017-03-10 PROCEDURE — 88302 TISSUE EXAM BY PATHOLOGIST: CPT | Performed by: OBSTETRICS & GYNECOLOGY

## 2017-03-10 PROCEDURE — 25800025 ZZH RX 258: Performed by: OBSTETRICS & GYNECOLOGY

## 2017-03-10 PROCEDURE — 25800025 ZZH RX 258: Performed by: NURSE ANESTHETIST, CERTIFIED REGISTERED

## 2017-03-10 PROCEDURE — 85018 HEMOGLOBIN: CPT | Performed by: OBSTETRICS & GYNECOLOGY

## 2017-03-10 PROCEDURE — C9290 INJ, BUPIVACAINE LIPOSOME: HCPCS | Performed by: STUDENT IN AN ORGANIZED HEALTH CARE EDUCATION/TRAINING PROGRAM

## 2017-03-10 PROCEDURE — 88302 TISSUE EXAM BY PATHOLOGIST: CPT | Mod: 26 | Performed by: OBSTETRICS & GYNECOLOGY

## 2017-03-10 PROCEDURE — 25000125 ZZHC RX 250: Performed by: STUDENT IN AN ORGANIZED HEALTH CARE EDUCATION/TRAINING PROGRAM

## 2017-03-10 PROCEDURE — 85027 COMPLETE CBC AUTOMATED: CPT | Performed by: OBSTETRICS & GYNECOLOGY

## 2017-03-10 PROCEDURE — 37000008 ZZH ANESTHESIA TECHNICAL FEE, 1ST 30 MIN: Performed by: OBSTETRICS & GYNECOLOGY

## 2017-03-10 PROCEDURE — 71000014 ZZH RECOVERY PHASE 1 LEVEL 2 FIRST HR: Performed by: OBSTETRICS & GYNECOLOGY

## 2017-03-10 PROCEDURE — 36000053 ZZH SURGERY LEVEL 2 EA 15 ADDTL MIN - UMMC: Performed by: OBSTETRICS & GYNECOLOGY

## 2017-03-10 RX ORDER — SODIUM CHLORIDE, SODIUM LACTATE, POTASSIUM CHLORIDE, CALCIUM CHLORIDE 600; 310; 30; 20 MG/100ML; MG/100ML; MG/100ML; MG/100ML
INJECTION, SOLUTION INTRAVENOUS CONTINUOUS
Status: DISCONTINUED | OUTPATIENT
Start: 2017-03-10 | End: 2017-03-10 | Stop reason: HOSPADM

## 2017-03-10 RX ORDER — NALOXONE HYDROCHLORIDE 0.4 MG/ML
.1-.4 INJECTION, SOLUTION INTRAMUSCULAR; INTRAVENOUS; SUBCUTANEOUS
Status: DISCONTINUED | OUTPATIENT
Start: 2017-03-10 | End: 2017-03-10 | Stop reason: HOSPADM

## 2017-03-10 RX ORDER — LIDOCAINE 40 MG/G
CREAM TOPICAL
Status: DISCONTINUED | OUTPATIENT
Start: 2017-03-10 | End: 2017-03-12 | Stop reason: HOSPADM

## 2017-03-10 RX ORDER — KETOROLAC TROMETHAMINE 30 MG/ML
INJECTION, SOLUTION INTRAMUSCULAR; INTRAVENOUS PRN
Status: DISCONTINUED | OUTPATIENT
Start: 2017-03-10 | End: 2017-03-10

## 2017-03-10 RX ORDER — METOCLOPRAMIDE HYDROCHLORIDE 5 MG/ML
10 INJECTION INTRAMUSCULAR; INTRAVENOUS ONCE
Status: DISCONTINUED | OUTPATIENT
Start: 2017-03-10 | End: 2017-03-10 | Stop reason: HOSPADM

## 2017-03-10 RX ORDER — FENTANYL CITRATE 50 UG/ML
INJECTION, SOLUTION INTRAMUSCULAR; INTRAVENOUS PRN
Status: DISCONTINUED | OUTPATIENT
Start: 2017-03-10 | End: 2017-03-10

## 2017-03-10 RX ORDER — SODIUM CHLORIDE, SODIUM LACTATE, POTASSIUM CHLORIDE, CALCIUM CHLORIDE 600; 310; 30; 20 MG/100ML; MG/100ML; MG/100ML; MG/100ML
INJECTION, SOLUTION INTRAVENOUS CONTINUOUS PRN
Status: DISCONTINUED | OUTPATIENT
Start: 2017-03-10 | End: 2017-03-10

## 2017-03-10 RX ORDER — METOCLOPRAMIDE 10 MG/1
10 TABLET ORAL ONCE
Status: DISCONTINUED | OUTPATIENT
Start: 2017-03-10 | End: 2017-03-10 | Stop reason: HOSPADM

## 2017-03-10 RX ORDER — LIDOCAINE HYDROCHLORIDE 20 MG/ML
INJECTION, SOLUTION INFILTRATION; PERINEURAL PRN
Status: DISCONTINUED | OUTPATIENT
Start: 2017-03-10 | End: 2017-03-10

## 2017-03-10 RX ORDER — FENTANYL CITRATE 50 UG/ML
25-50 INJECTION, SOLUTION INTRAMUSCULAR; INTRAVENOUS EVERY 5 MIN PRN
Status: DISCONTINUED | OUTPATIENT
Start: 2017-03-10 | End: 2017-03-10 | Stop reason: HOSPADM

## 2017-03-10 RX ORDER — CEFAZOLIN SODIUM 1 G/3ML
INJECTION, POWDER, FOR SOLUTION INTRAMUSCULAR; INTRAVENOUS PRN
Status: DISCONTINUED | OUTPATIENT
Start: 2017-03-10 | End: 2017-03-10

## 2017-03-10 RX ORDER — FENTANYL CITRATE 50 UG/ML
25-50 INJECTION, SOLUTION INTRAMUSCULAR; INTRAVENOUS
Status: DISCONTINUED | OUTPATIENT
Start: 2017-03-10 | End: 2017-03-10 | Stop reason: HOSPADM

## 2017-03-10 RX ORDER — PROPOFOL 10 MG/ML
INJECTION, EMULSION INTRAVENOUS PRN
Status: DISCONTINUED | OUTPATIENT
Start: 2017-03-10 | End: 2017-03-10

## 2017-03-10 RX ORDER — BUPIVACAINE HYDROCHLORIDE 2.5 MG/ML
INJECTION, SOLUTION INFILTRATION; PERINEURAL PRN
Status: DISCONTINUED | OUTPATIENT
Start: 2017-03-10 | End: 2017-03-10 | Stop reason: HOSPADM

## 2017-03-10 RX ORDER — NEOSTIGMINE METHYLSULFATE 1 MG/ML
VIAL (ML) INJECTION PRN
Status: DISCONTINUED | OUTPATIENT
Start: 2017-03-10 | End: 2017-03-10

## 2017-03-10 RX ORDER — ONDANSETRON 4 MG/1
4 TABLET, ORALLY DISINTEGRATING ORAL EVERY 30 MIN PRN
Status: DISCONTINUED | OUTPATIENT
Start: 2017-03-10 | End: 2017-03-10 | Stop reason: HOSPADM

## 2017-03-10 RX ORDER — MEPERIDINE HYDROCHLORIDE 25 MG/ML
12.5 INJECTION INTRAMUSCULAR; INTRAVENOUS; SUBCUTANEOUS
Status: DISCONTINUED | OUTPATIENT
Start: 2017-03-10 | End: 2017-03-10 | Stop reason: HOSPADM

## 2017-03-10 RX ORDER — CALCIUM CARBONATE 500 MG/1
500 TABLET, CHEWABLE ORAL 3 TIMES DAILY PRN
Status: DISCONTINUED | OUTPATIENT
Start: 2017-03-10 | End: 2017-03-12 | Stop reason: HOSPADM

## 2017-03-10 RX ORDER — ONDANSETRON 2 MG/ML
INJECTION INTRAMUSCULAR; INTRAVENOUS PRN
Status: DISCONTINUED | OUTPATIENT
Start: 2017-03-10 | End: 2017-03-10

## 2017-03-10 RX ORDER — OXYCODONE HYDROCHLORIDE 5 MG/1
5 TABLET ORAL EVERY 4 HOURS PRN
Status: DISCONTINUED | OUTPATIENT
Start: 2017-03-10 | End: 2017-03-11

## 2017-03-10 RX ORDER — FLUMAZENIL 0.1 MG/ML
0.2 INJECTION, SOLUTION INTRAVENOUS
Status: DISCONTINUED | OUTPATIENT
Start: 2017-03-10 | End: 2017-03-10 | Stop reason: HOSPADM

## 2017-03-10 RX ORDER — ONDANSETRON 2 MG/ML
4 INJECTION INTRAMUSCULAR; INTRAVENOUS EVERY 30 MIN PRN
Status: DISCONTINUED | OUTPATIENT
Start: 2017-03-10 | End: 2017-03-10 | Stop reason: HOSPADM

## 2017-03-10 RX ORDER — GLYCOPYRROLATE 0.2 MG/ML
INJECTION, SOLUTION INTRAMUSCULAR; INTRAVENOUS PRN
Status: DISCONTINUED | OUTPATIENT
Start: 2017-03-10 | End: 2017-03-10

## 2017-03-10 RX ORDER — BUPIVACAINE HYDROCHLORIDE AND EPINEPHRINE 2.5; 5 MG/ML; UG/ML
INJECTION, SOLUTION INFILTRATION; PERINEURAL PRN
Status: DISCONTINUED | OUTPATIENT
Start: 2017-03-10 | End: 2017-03-10

## 2017-03-10 RX ADMIN — BUPIVACAINE 20 ML: 13.3 INJECTION, SUSPENSION, LIPOSOMAL INFILTRATION at 14:05

## 2017-03-10 RX ADMIN — SODIUM CHLORIDE, POTASSIUM CHLORIDE, SODIUM LACTATE AND CALCIUM CHLORIDE: 600; 310; 30; 20 INJECTION, SOLUTION INTRAVENOUS at 13:55

## 2017-03-10 RX ADMIN — Medication 100 MG: at 14:26

## 2017-03-10 RX ADMIN — ONDANSETRON 4 MG: 2 INJECTION INTRAMUSCULAR; INTRAVENOUS at 14:45

## 2017-03-10 RX ADMIN — RANITIDINE 150 MG: 150 TABLET ORAL at 19:51

## 2017-03-10 RX ADMIN — ONDANSETRON HYDROCHLORIDE 4 MG: 4 TABLET, FILM COATED ORAL at 18:50

## 2017-03-10 RX ADMIN — FENTANYL CITRATE 50 MCG: 50 INJECTION, SOLUTION INTRAMUSCULAR; INTRAVENOUS at 14:22

## 2017-03-10 RX ADMIN — NEOSTIGMINE METHYLSULFATE 3 MG: 1 INJECTION INTRAMUSCULAR; INTRAVENOUS; SUBCUTANEOUS at 15:12

## 2017-03-10 RX ADMIN — KETOROLAC TROMETHAMINE 30 MG: 30 INJECTION, SOLUTION INTRAMUSCULAR at 15:23

## 2017-03-10 RX ADMIN — SENNOSIDES AND DOCUSATE SODIUM 1 TABLET: 8.6; 5 TABLET ORAL at 08:09

## 2017-03-10 RX ADMIN — SERTRALINE HYDROCHLORIDE 50 MG: 50 TABLET ORAL at 08:08

## 2017-03-10 RX ADMIN — BUPIVACAINE HYDROCHLORIDE AND EPINEPHRINE BITARTRATE 20 ML: 2.5; .005 INJECTION, SOLUTION INFILTRATION; PERINEURAL at 14:05

## 2017-03-10 RX ADMIN — PROPOFOL 180 MG: 10 INJECTION, EMULSION INTRAVENOUS at 14:23

## 2017-03-10 RX ADMIN — Medication 5 MG: at 14:26

## 2017-03-10 RX ADMIN — LIDOCAINE HYDROCHLORIDE 60 MG: 20 INJECTION, SOLUTION INFILTRATION; PERINEURAL at 14:22

## 2017-03-10 RX ADMIN — CEFAZOLIN 2 G: 1 INJECTION, POWDER, FOR SOLUTION INTRAMUSCULAR; INTRAVENOUS at 14:30

## 2017-03-10 RX ADMIN — BUPRENORPHINE HYDROCHLORIDE, NALOXONE HYDROCHLORIDE 1 FILM: 8; 2 FILM, SOLUBLE BUCCAL; SUBLINGUAL at 08:08

## 2017-03-10 RX ADMIN — BUPRENORPHINE HYDROCHLORIDE, NALOXONE HYDROCHLORIDE 1 FILM: 4; 1 FILM, SOLUBLE BUCCAL; SUBLINGUAL at 18:23

## 2017-03-10 RX ADMIN — ACETAMINOPHEN 650 MG: 325 TABLET, FILM COATED ORAL at 06:39

## 2017-03-10 RX ADMIN — LIDOCAINE HYDROCHLORIDE 0.2 ML: 10 INJECTION, SOLUTION EPIDURAL; INFILTRATION; INTRACAUDAL; PERINEURAL at 05:37

## 2017-03-10 RX ADMIN — PHENYLEPHRINE HYDROCHLORIDE 100 MCG: 10 INJECTION, SOLUTION INTRAMUSCULAR; INTRAVENOUS; SUBCUTANEOUS at 14:53

## 2017-03-10 RX ADMIN — MIDAZOLAM 1 MG: 1 INJECTION INTRAMUSCULAR; INTRAVENOUS at 14:02

## 2017-03-10 RX ADMIN — SODIUM CHLORIDE, POTASSIUM CHLORIDE, SODIUM LACTATE AND CALCIUM CHLORIDE 500 ML: 600; 310; 30; 20 INJECTION, SOLUTION INTRAVENOUS at 05:42

## 2017-03-10 RX ADMIN — IBUPROFEN 800 MG: 400 TABLET ORAL at 03:39

## 2017-03-10 RX ADMIN — GLYCOPYRROLATE 0.6 MG: 0.2 INJECTION, SOLUTION INTRAMUSCULAR; INTRAVENOUS at 15:12

## 2017-03-10 RX ADMIN — SENNOSIDES AND DOCUSATE SODIUM 1 TABLET: 8.6; 5 TABLET ORAL at 19:51

## 2017-03-10 RX ADMIN — PHENYLEPHRINE HYDROCHLORIDE 100 MCG: 10 INJECTION, SOLUTION INTRAMUSCULAR; INTRAVENOUS; SUBCUTANEOUS at 15:00

## 2017-03-10 RX ADMIN — Medication 15 MG: at 14:31

## 2017-03-10 RX ADMIN — RANITIDINE 150 MG: 150 TABLET ORAL at 08:09

## 2017-03-10 NOTE — DISCHARGE SUMMARY
VAGINAL DELIVERY DISCHARGE SUMMARY    Admit date: 3/8/2017  Discharge date: 3/12/2017    Admit Dx:   - 41 year old  at 36w0d  - Asymmetric IUGR  - Abnormal fetal umbilical artery dopplers  - Oligohydramnios  - Type 2 DM, poorly controlled  - Polysubstance abuse, on suboxone, multiple admissions for withdrawal in pregnancy   - GBS unknown status  - Depression  - NIPT with T21   - Advanced maternal age  - ASCUS PAP smear  - GERD    Discharge Dx:  - Same as above, s/p   - Possible HIV/AIDS exposure from partner  - Elevated EDS of 13     Procedures:  - Spontaneous vaginal delivery  - EKG    Admit HPI:  Ms. Cindy Fisher is a 41 year old now  at 36w0d, admitted for induction of labor for asymmetric IUGR, abnormal dopplers and oligohydramnios. Pregnancy was complicated by: Type II DM, IUGR, oligohydramnios, abnormal dopplers, polysubstance abuse on suboxone, Depression on zoloft, AMA, PAP with ASCUS, fetus with VSD and increase Trisomy 21 risk and abnormal U/S findings suggestive of trisomy 21, unknown HIV status with FOB with AIDS.  Please see her admit H&P for full details of her PMH, PSH, Meds, Allergies and exam on admit.    Hospital course:  She underwent induction of labor with misoprostol x1 and began to spontaneously contract following medication. A cook balloon was placed and fell out overnight. She was started on IV Pitocin and later progressed rapidly to complete. Given Cat II FHT with recurrent decelerations during rapid dilation of cervix, AROM was performed and a FSE was placed. The infant delivered shortly after being confirmed complete. Infant delivered in OA position. NICU was present. Shoulders delivered easily. No nuchal cord. Infant with spontaneous cry and handed over to NICU team to assess. APGARS 8 and 9 at 1min and 5 mins respectively. Placed on mother's chest following NICU assessment. Facial features consistent with Trisomy 21. At time of birth, GBS returned negative. Rapid HIV  prior to onset of active labor was negative. Placenta delivered with gentle cord traction; noted to be intact with 3 vessel cord. No perineal or vaginal lacerations noted. Fundus firm and lochia minimal at the end of the case. 20 U IV Pitocin given. EBL 50 cc. Please see her Delivery Summary for full details regarding her delivery.    Her postpartum course was complicated by her multiple medical comorbidities as detailed below:  1) T2DM: Her blood sugars her low to normal during the postpartum period and she did not meet criteria for any insulin. She did have issues with nausea and was not tolerating a regular diet until the day of discharge. Endocrine recommended discharge without any insulin or oral glycemic medications with close follow-up with her PCP within 1-2 weeks, given likelihood of needing insulin with diet she typically consumes in the home setting.   2) Polysubstance abuse: UDS negative on admission. She was maintained on suboxone 8mg/4mg during her stay with some issues with nausea, but no other evidence of acute withdrawal during her hospitalization. Plan to adjust medication as needed with her suboxone clinic; [facility]. She was discharged on PPD#3 after receiving her evening suboxone dose with plans to pick-up Mon morning.   3) Possible HIV exposure in partner: Rapid HIV, HIV RNA quant and viral load all negative. Plan to f/u in ID clinic to discuss PrEP. Recommended condom use. Baby received HIV ppx per Peds ID.   4) Depression: continue zoloft, SW following given high EDS - resources offered.   5) ASCUS Pap: needs postpartum colposcopy   6) GERD: on PPD#1 she felt acute onset of chest pain. Work-up at that time revealed a nl EKG (reviewed with Cardiology), CBC with stable Hgb and normal BMP. Her pain resolved with zofran and tums.   7) Acute on chronic blood loss anemia, asymptomatic and expected from vaginal delivery and surgery: discharge to home with iron    On PPD#3, she was  meeting all of her postpartum goals and deemed stable for discharge. She was voiding without difficulty, tolerating a regular diet without nausea and vomiting, her pain was well controlled on oral pain medicines and her lochia was appropriate. Her hemoglobin prior to delivery was 9.4 and after delivery was 8.7 - she was discharged home with iron. Her Rh status was POS, and Rhogam was not indicated. At the time of discharge, she was bottlefeeding her infant and received a PPTL during her admission for contraception.    Discharge/Disposition:  Ms. Cindy Fisher was discharged to home in stable condition with the following instructions/medications:  - Call for temperature > 100.4, foul smelling vaginal discharge, bleeding > 1 pad per hour x 2 hrs, pain not controlled by oral pain meds, severe constipation or severe nausea or vomiting.  - She was instructed to follow-up with her primary OB in 6 weeks for a routine postpartum visit, colposcopy and 1 week for a mood check. Also recommended to f/u with PCP within 1 week for management of T2DM with QID blood sugar checks in the meantime. Recommended to f/u in ID clinic within 1-2 weeks for PrEP. Continue daily suboxone through [facility] with Mon pick-up as scheduled.   - She was discharged home with the following medications:      Review of your medicines      START taking       Dose / Directions    ferrous gluconate 324 (38 FE) MG tablet   Commonly known as:  FERGON   Used for:  Anemia due to blood loss, acute        Dose:  324 mg   Take 1 tablet (324 mg) by mouth daily (with breakfast)   Quantity:  100 tablet   Refills:  1       ibuprofen 400 MG tablet   Commonly known as:  ADVIL/MOTRIN        Dose:  400-800 mg   Take 1-2 tablets (400-800 mg) by mouth every 6 hours as needed for other (cramping)   Quantity:  120 tablet   Refills:  1       oxyCODONE-acetaminophen 5-325 MG per tablet   Commonly known as:  PERCOCET   Used for:  S/P tubal ligation         Dose:  1 tablet   Take 1 tablet by mouth every 4 hours as needed for pain   Quantity:  10 tablet   Refills:  0       senna-docusate 8.6-50 MG per tablet   Commonly known as:  SENOKOT-S;PERICOLACE        Dose:  1-2 tablet   Take 1-2 tablets by mouth 2 times daily   Quantity:  100 tablet   Refills:  1         CONTINUE these medicines which have NOT CHANGED       Dose / Directions    albuterol 108 (90 BASE) MCG/ACT Inhaler   Commonly known as:  PROAIR HFA/PROVENTIL HFA/VENTOLIN HFA        Dose:  2 puff   Inhale 2 puffs into the lungs as needed   Refills:  0       blood glucose monitoring lancets   Used for:  Type 2 diabetes mellitus with complication, unspecified long term insulin use status (H)        Use to test blood sugar 4 times daily or as directed.   Quantity:  1 Box   Refills:  0       blood glucose monitoring meter device kit   Used for:  Type 2 diabetes mellitus with complication, unspecified long term insulin use status (H)        Use to test blood sugar 4 times daily or as directed.   Quantity:  1 kit   Refills:  0       buprenorphine HCl-naloxone HCl 8-2 MG per film   Commonly known as:  SUBOXONE        Dose:  1 Film   Place 1 Film under the tongue 2 times daily   Refills:  0       ranitidine 150 MG tablet   Commonly known as:  ZANTAC   Used for:  Pregnant state, incidental        Dose:  150 mg   Take 1 tablet (150 mg) by mouth 2 times daily   Quantity:  60 tablet   Refills:  1       sertraline 50 MG tablet   Commonly known as:  ZOLOFT   Used for:  Withdrawal from opioids (H)        Dose:  50 mg   Take 1 tablet (50 mg) by mouth daily   Quantity:  60 tablet   Refills:  0         STOP taking          acetaminophen 325 MG tablet   Commonly known as:  TYLENOL           LEVEMIR FLEXPEN/FLEXTOUCH 100 UNIT/ML injection   Generic drug:  insulin detemir           NovoLOG FLEXPEN 100 UNIT/ML injection   Generic drug:  insulin aspart           sennosides 8.6 MG tablet   Commonly known as:  SENOKOT                 Where to get your medicines      These medications were sent to [facility]  [address] [address]     Phone:  [phone]      ferrous gluconate 324 (38 FE) MG tablet     ibuprofen 400 MG tablet     senna-docusate 8.6-50 MG per tablet         Some of these will need a paper prescription and others can be bought over the counter. Ask your nurse if you have questions.     Bring a paper prescription for each of these medications      oxyCODONE-acetaminophen 5-325 MG per tablet             Melody Vazquez MD  OB/GYN Resident PGY3  03/12/17        [facility] staff:  Appreciate note by Dr. Vazquez.  I have seen and examined the patient without the resident. I have reviewed, edited, and agree with the note.    My findings are: in the daily note.     Mini Garnica MD, FACOG  3/13/2017  1:36 PM

## 2017-03-10 NOTE — PROGRESS NOTES
Post Partum Progress Note  PPD#1    Subjective:  She is resting comfortably in bed this morning. Complains of abdominal pain, back pain at the epidural site, and swelling. Pain is improving and well controlled on current medication regiment. Tolerating minimal PO intake.  Lochia present and decreasing.  Passing flatus. Ambulating without dizziness or difficulty.  She denies headache, changes in vision, nausea/vomiting, chest pain, shortness of breath, RUQ pain.  Bottlefeeding.    Patient desires PPTL.    Objective:  Vitals:    17 1400 17 1534 17 2021 17 2336   BP: 131/70 119/71 103/60 107/66   Resp:       Temp:  97.8  F (36.6  C) 98.1  F (36.7  C) 98.2  F (36.8  C)   TempSrc:  Oral Oral    SpO2: 98% 99%         General: NAD. A&Ox3.  CV: RRR.  Pulm: CTAB. Normal respiratory effort.  Abd: Soft, non-tender, non-distended. Fundus is firm and at the umbilicus.  Ext: 1-2+ edema. No calf tenderness.    Results for CINDY AWAD (MRN [id number]) as of 3/10/2017 05:57   3/9/2017 14:32 3/9/2017 20:29 3/10/2017 00:34 3/10/2017 03:25   Glucose 106 (H) 100 (H) 100 (H) 85       Assessment/Plan:  Cindy Awad is a 41 year old  female who is PPD#1 s/p .  Pregnancy complicated by T2DM, history of polysubstance abuse on suboxone, hx of depression, and HIV exposure.    - T2DM: Blood glucose as above. Endocrine consulted, agree with Low dose SSI and QID checks. No SSI needed.  - Hx of PSA: UDS negative on admission. Continue Suboxone 8mg AM and 4 mg PM  - HIV exposure: Rapid HIV negative, VL pending.  - Depression: Continue Zoloft  - PNC: Rh +. Rubella immune. No intervention indicated.  - Pain: controlled on oral medications  - Heme: Hgb 9.7> EBL 50> am pending.  If <10 will discharge home with iron.  - GI: continue anti-emetics and stool softeners as needed.  - : Has yet to spontaneously void since delivery. Bladder scanned at 10 pm for 400 cc, Straight cathed for 200cc.  Poor PO intake per  RN.  500 cc bolus ordered and repeat bladder scan was 220 cc. Straight cath protocol.  - Infant: Stable in room  - Feeding: Bottlefeeding.  - BC: Plans on PPTL, NPO since 0330.  - SW following    Tubal consult:  - The permanent nature of the tubal ligation was discussed at length. The patient is certain that she wants no future children. The risks of the procedure were discussed, including: Regret, failure rate (approximately 1-2:1000 risk of pregnancy), increased risk of ectopic gestation should pregnancy occur, bleeding, infection, and injury to other organs. Other forms of non-permanent contraception (ie. IUD and implanon) and other methods for permanent sterilization (ie. Vasectomy, laparoscopic and hysteroscopic tubal ligation) were discussed. The patient was given time to ask questions and stated that she was certain that she wanted to proceed with the procedure.   - Federal tubal papers were verified to be accurate and signed on 2/2/17. General surgical consent for bilateral tubal ligation was also obtained.   - After discussing the procedure the patient was informed that if she felt uncertain about proceeding she could decline the procedure at any time.     Discharge to home on PPD#2    Chantelle Romero MD  OBGYN PGY-2  4:10 AM 3/10/2017    Staff MD Note    I appreciate the note by Dr. Romero.  Any necessary changes have been made by me.  I evaluated the patient with the resident and agree with the assessment and plan.    Elinor Reyes MD

## 2017-03-10 NOTE — PROGRESS NOTES
Brief Diabetes Management Team Note    Chart reviewed, pt not seen.  Ms. Fisher's glucoses have all been in normal range since delivery with no insulin on board ( 80s-106).  Discussed with Dr. Stewart.  After discharge recommend that pt monitor glucose 2 x/day: fasting and alternate times for pre-meal check.  Follow up on glucoses in 4-6 weeks, repeat GTT at 6 weeks.      While inpatient continue low intensity correction ac and hs.    Please contact DM team if further assistance needed with glucose management.    Yesica NGO-TONO [phone]

## 2017-03-10 NOTE — ANESTHESIA POSTPROCEDURE EVALUATION
Patient: Cindy Fisher    Procedure(s):  Post Partum Tubal Ligation - Wound Class: I-Clean    Diagnosis:Desire for Sterilization  Diagnosis Additional Information: No value filed.    Anesthesia Type:  General, ETT, RSI    Note:  Anesthesia Post Evaluation    Patient location during evaluation: PACU  Patient participation: Able to fully participate in evaluation  Level of consciousness: awake  Pain management: adequate  Airway patency: patent  Cardiovascular status: acceptable and stable  Respiratory status: acceptable and room air  Hydration status: acceptable  PONV: none     Anesthetic complications: None          Last vitals:  Vitals:    03/10/17 1530 03/10/17 1545 03/10/17 1600   BP:  121/73 127/76   Pulse:      Resp: 17 16 16   Temp:      SpO2:  97% 99%         Electronically Signed By: Rodriguez Kaplan MD  March 10, 2017  4:00 PM

## 2017-03-10 NOTE — PLAN OF CARE
Problem: Goal Outcome Summary  Goal: Goal Outcome Summary  Outcome: Therapy, progress toward functional goals as expected  VSS, postpartum assessment WNL. Pt unable to void upon return from NICU. Bladder scanned for >450 mls and straight cathed for 200 mls of concentrated urine. Encouraged pt to increase fluid intake. Infant returned to [facility] from NICU at 2230. Infant and mother bonding well. Continue plan of care.

## 2017-03-10 NOTE — OP NOTE
Operative Note    Cindy Fisher  [id number]  1975     Pre-Op Diagnosis:   1. S/p   2. Desires permanent sterilization     Post-Op Diagnosis: Same, s/p procedure below      Procedure: Postpartum bilateral tubal ligation in the Sharita fashion     Surgeon: Dr. Reyes  Assistants: Radha Miller PGY1, Nitish Pascal MS3     Anesthesia: GETA     Fluids: 1000 mL crystalloid  EBL: 5 mL  UOP: Not drained     Findings: Normal appearing fallopian tubes with fimbriae visualized. Telescoping observed bilaterally. Ends of tubes hemostatic.     Specimens: Portion of right fallopian tube, portion of left fallopian tube    Indications: 41 year old  who desires permanent sterilization. Risks/benefits of procedure discussed with patient including risk of failure of 3-1000 with increased risk of ectopic gestation if pregnancy occurs.    Procedure: The patient was taken to the operative where she was placed under GETA. A small, transverse infra-umbicical skin incision was then made with the scalpel. The incision was carried down through the underlying fascia which was grasped with Kochers and then entered sharply with Lovelace scissors and extended bilaterally.  The peritoneum was identified and entered bluntly. The peritoneum was noted to be free of any adhesions.     The patient was then airplaned to the right side and the patient's left fallopian tube was then identified, brought to the incision, and grasped with a [place] clamp. The tube was then followed out to the fimbria. The Mckenna clamp was then used to grasp the tube approximately 4 cm from the cornual region. A 3 cm segment of tube was ligated with 2 free tie of plain gut and excised in the [place] fashion. Good hemostasis and telescoping were noted and the tube was returned to the abdomen. The patient was then airplaned to the left side and the right fallopian tube was then ligated, and a 3 cm segment excised in similar fashion. Excellent hemostasis and  telescoping were noted, and the tube returned to the abdomen.     The fascia was then closed in a single layer of 0 Vicryl. The skin was closed in subcuticular fashion with 4-0 Monocryl.     The patient tolerated the procedure well. Sponge, lap, and needle counts were correct times two. The patient was taken to the recovery room in stable condition.    Complications: None apparent    Radha Miller DO  Ob/Gyn PGY-1  03/10/17 4:21 PM    Staff MD Note  I was present and scrubbed for the entire procedure noted above.  I agree with the description above and any necessary changes have been made by me.  Elinor Reyes MD

## 2017-03-10 NOTE — PROGRESS NOTES
D:  Follow-up visit with Cindy today.  Her energies are focused on her tubal ligation so her engagement with me was limited today.      Shared information about the [employer] and encouraged Cindy to connect with them for parenting resources and for support.  Also shared information about SSI for baby given Trisomy 21 is a presumptive disability and, therefore, she will automatically meet medical criteria.      P: Cindy is well-connected with community resources for support related to her recovery and her mental health.  No additional SW intervention anticipated at this point.  Please refer again if needs/concerns arise prior to discharge.

## 2017-03-10 NOTE — ANESTHESIA PROCEDURE NOTES
Peripheral Nerve Block Procedure Note    Staff:     Anesthesiologist:  CHERYL MCFADDEN  Location: Pre-op  Procedure Start/Stop TImes:      3/10/2017 2:00 PM     3/10/2017 2:08 PM    patient identified, IV checked, site marked, risks and benefits discussed, informed consent, monitors and equipment checked, pre-op evaluation, at physician/surgeon's request and post-op pain management      Correct Patient: Yes      Correct Position: Yes      Correct Site: Yes      Correct Procedure: Yes      Correct Laterality:  Yes    Site Marked:  Yes  Procedure details:     Procedure:  TAP    ASA:  2    Diagnosis:  Tubal ligation    Laterality:  Bilateral    Position:  Supine    Sterile Prep: chloraprep, mask and sterile gloves      Local skin infiltration:  None    Needle:  Short bevel    Needle gauge:  21    Needle length (mm):  110    Ultrasound: Yes      Ultrasound used to identify targeted nerve, plexus, or vascular structure and placed a needle adjacent to it      Permanent Image entered into patiient's record      Abnormal pain on injection: No      Blood Aspirated: No      Paresthesias:  No    Bleeding at site: No      Bolus via:  Needle    Infusion Method:  Single Shot    Complications:  None  Assessment/Narrative:     Injection made incrementally with aspirations every (mL):  5     Bilateral TAP blocks (10ml exparel + 10ml 0.25% bupivacaine w 1:200:000 epi into each TAP site)

## 2017-03-10 NOTE — BRIEF OP NOTE
Brief Operative Note  Gynecology Service    Cindy Fisher  [id number]  1975    Pre-Op Diagnosis:    1. S/p   2. Desires permanent sterilization    Post-Op Diagnosis: Same, s/p procedure below     Procedure: Postpartum bilateral tubal ligation in the Sharita fashion    Surgeon: Dr. Reyes  Assistants: Radha Miller PGY1, Nitish Pascal MS3    Anesthesia: GETA    Fluids: 1000 mL crystalloid  EBL: 5 mL  UOP: Not drained    Findings: Normal appearing fallopian tubes with fimbriae visualized. Telescoping observed bilaterally. Ends of tubes hemostatic.    Specimens: Portion of right fallopian tube, portion of left fallopian tube    Complications: None apparent    Condition: Stable to PACU    Disposition: Return to postpartum for routine postpartum cares    Radha Miller DO  Ob/Gyn PGY-1  03/10/17 3:13 PM

## 2017-03-10 NOTE — PLAN OF CARE
Problem: Goal Outcome Summary  Goal: Goal Outcome Summary  Outcome: Improving  [id number]  Pt arrived to unit at about 1500, tolerated transfer. Up ad martínez in room. No void since delivery, attempted emptying bladder and was unable to. Offered to cath, but patient wanted to see infant first. Will evaluate patient upon arrival to unit.

## 2017-03-10 NOTE — PLAN OF CARE
"Problem: Goal Outcome Summary  Goal: Goal Outcome Summary  Outcome: Therapy, progress toward functional goals as expected  VSS, postpartum assessment WNL. Pain controlled with Ibuprofen and Tylenol. Ambulating in room independently. Pt continues to have difficulty voiding - bladder scanned at 0500 for 260, pt requesting more time to void as she has \"not drank much\" since last straight cath. RN had 3x attempts to start IV with no success. Anesthesia able to start at 0545 and fluid bolus started. Pt planning on tubal this morning, will remain NPO and have pt shower in chlorhexidine at 0600. Pt formula feeding infant overnight, continue to encourage independence with infant cares. Continue plan of care.       "

## 2017-03-10 NOTE — ANESTHESIA CARE TRANSFER NOTE
Patient: Cindy Fisher    Procedure(s):  Post Partum Tubal Ligation - Wound Class: I-Clean    Diagnosis: Desire for Sterilization  Diagnosis Additional Information: No value filed.    Anesthesia Type:   General, ETT, RSI     Note:  Airway :Face Mask  Patient transferred to:PACU  Comments: Cindy arrived in PACU coughing occasionally.  She denies pain and nausea.  Report was given and all questions answered.      Vitals: (Last set prior to Anesthesia Care Transfer)    CRNA VITALS  3/10/2017 1506 - 3/10/2017 1536      3/10/2017             Resp Rate (set): 10                Electronically Signed By: Dick Sky CRNA, APRN CRNA  March 10, 2017  3:36 PM

## 2017-03-10 NOTE — PROVIDER NOTIFICATION
03/10/17 0350   Provider Notification   Provider Name/Title Nick   Method of Notification Electronic Page   Request Evaluate-Remote   Notification Reason Other   Pt would like to proceed with tubal. Pt has not voided on own since delivery. Because of NPO would you like fluid bolus? Thanks

## 2017-03-10 NOTE — ANESTHESIA PREPROCEDURE EVALUATION
Physical Exam  Normal systems: cardiovascular, pulmonary and dental    Airway   Mallampati: II  TM distance: < 3 FB  Neck ROM: full    Dental     Cardiovascular       Pulmonary     Other findings: DMII  Polysubstance abuse on suboxone                 Anesthesia Plan      History & Physical Review  History and physical reviewed and following examination; no interval change.    ASA Status:  2 .    NPO Status:  > 6 hours    Plan for General, ETT and RSI with Intravenous and Propofol induction. Maintenance will be Balanced.    PONV prophylaxis:  Ondansetron (or other 5HT-3) and Dexamethasone or Solumedrol       Postoperative Care  Postoperative pain management:  IV analgesics, Oral pain medications and Multi-modal analgesia.      Consents  Anesthetic plan, risks, benefits and alternatives discussed with:  Patient..        ANESTHESIA PREOP EVALUATION    PROCEDURE: Procedure(s):  Post Partum Tubal Ligation - Wound Class: II-Clean Contaminated    HPI: Cindy Fisher is a 41 year old female who presents for above procedure 2/2 desires sterilization.     PAST MEDICAL HISTORY:    Past Medical History   Diagnosis Date     Anemia      Anxiety      Migraine, unspecified, without mention of intractable migraine without mention of status migrainosus      Migraine     Postpartum depression      Type II or unspecified type diabetes mellitus without mention of complication, not stated as uncontrolled summer 2006     Diabetes mellitus     Uncomplicated asthma      with cold sx       PAST SURGICAL HISTORY:    Past Surgical History   Procedure Laterality Date     Cholecystectomy       Appendectomy         PAST ANESTHESIA HISTORY:     No personal or family h/o anesthesia problems    SOCIAL HISTORY:       Social History   Substance Use Topics     Smoking status: Never Smoker     Smokeless tobacco: Not on file     Alcohol use No       ALLERGIES:     Allergies   Allergen Reactions     Codeine      Other reaction(s): Chest Pain      "Compazine      Feels \"in another world\"     Droperidol      Would stare at people, couldn't speak     Propoxyphene      Tramadol Rash     Tylenol With Codeine #3 [Acetaminophen-Codeine] Hives     Vicodin [Hydrocodone-Acetaminophen] Hives     Morphine Itching and Anxiety     became jittery with most recent dose 1/15/ 08 2400       MEDICATIONS:     Prescriptions Prior to Admission   Medication Sig Dispense Refill Last Dose     albuterol (PROAIR HFA/PROVENTIL HFA/VENTOLIN HFA) 108 (90 BASE) MCG/ACT Inhaler Inhale 2 puffs into the lungs as needed        sennosides (SENOKOT) 8.6 MG tablet Take 1 tablet by mouth 2 times daily 60 tablet 1 Past Month at Unknown time     sertraline (ZOLOFT) 50 MG tablet Take 1 tablet (50 mg) by mouth daily 60 tablet 0 3/7/2017 at Unknown time     ranitidine (ZANTAC) 150 MG tablet Take 1 tablet (150 mg) by mouth 2 times daily 60 tablet 1 Past Month at Unknown time     blood glucose monitoring (ACCU-CHEK LYNN SMARTVIEW) meter device kit Use to test blood sugar 4 times daily or as directed. 1 kit 0 Past Week at Unknown time     blood glucose monitoring (ACCU-CHEK FASTCLIX) lancets Use to test blood sugar 4 times daily or as directed. 1 Box 0 Past Week at Unknown time     buprenorphine HCl-naloxone HCl (SUBOXONE) 8-2 MG per film Place 1 Film under the tongue 2 times daily    3/7/2017 at Unknown time     insulin aspart (NOVOLOG FLEXPEN) 100 UNIT/ML injection Inject Subcutaneous 3 times daily (with meals)   3/7/2017 at Unknown time     insulin detemir (LEVEMIR FLEXPEN/FLEXTOUCH) 100 UNIT/ML injection Inject 30 Units Subcutaneous every morning    3/7/2017 at Unknown time     acetaminophen (TYLENOL) 325 MG tablet Take 2 tablets (650 mg) by mouth every 4 hours as needed for mild pain 100 tablet 0 Unknown at Unknown time       No current outpatient prescriptions on file.       Current Facility-Administered Medications Ordered in Epic   Medication Dose Route Frequency Provider Last Rate Last Dose     " lidocaine 1 % 1 mL  1 mL Other Q1H PRN Chantelle Romero MD   0.2 mL at 03/10/17 0537     lidocaine (LMX4) kit   Topical Q1H PRN Chantelle Romero MD         sodium chloride (PF) 0.9% PF flush 3 mL  3 mL Intracatheter Q1H PRN Chantelle Romero MD         sodium chloride (PF) 0.9% PF flush 3 mL  3 mL Intracatheter Q8H Chantelle Romero MD   3 mL at 03/10/17 0533     albuterol (PROAIR HFA/PROVENTIL HFA/VENTOLIN HFA) Inhaler 2 puff  2 puff Inhalation Q1H PRN Irma Rodgers MD         oxytocin (PITOCIN) 30 units in 500 mL 0.9% NaCl infusion  100 mL/hr Intravenous Continuous Irma Rodgers MD         ibuprofen (ADVIL/MOTRIN) tablet 400-800 mg  400-800 mg Oral Q6H PRN Irma Rodgers MD   800 mg at 03/10/17 0339     acetaminophen (TYLENOL) tablet 650 mg  650 mg Oral Q4H PRN Irma Rodgers MD   650 mg at 03/10/17 0639     naloxone (NARCAN) injection 0.1-0.4 mg  0.1-0.4 mg Intravenous Q2 Min PRN Irma Rodgers MD         senna-docusate (SENOKOT-S;PERICOLACE) 8.6-50 MG per tablet 1-2 tablet  1-2 tablet Oral BID Irma Rodgers MD   1 tablet at 03/10/17 0809     [START ON 3/11/2017] bisacodyl (DULCOLAX) Suppository 10 mg  10 mg Rectal Daily PRN Irma Rodgers MD         [START ON 3/11/2017] sodium phosphate (FLEET ENEMA) 1 enema  1 enema Rectal Daily PRN Irma Rodgers MD         hydrocortisone 2.5 % cream   Rectal TID PRN Irma Rodgers MD         lanolin ointment   Topical Q1H PRN Irma Rodgers MD         lactated ringers BOLUS 1,000 mL  1,000 mL Intravenous Once PRN Irma Rodgers MD         oxytocin (PITOCIN) 30 units in 500 mL 0.9% NaCl infusion  340 mL/hr Intravenous Continuous PRN Irma Rodgers MD         oxytocin (PITOCIN) injection 10 Units  10 Units Intramuscular Once PRN Irma Rodgers MD         misoprostol (CYTOTEC) tablet 400 mcg  400 mcg Oral Once PRN Irma Rodgers MD         NO Rho (D) immune globulin (RhoGam) needed -  mother Rh POSITIVE   Does not apply Continuous PRN Irma Rodgers MD         No Tdap Needed - Assessment: Patient does not need Tdap vaccine   Does not apply Continuous PRN Irma Rodgers MD         glucose 40 % gel 15-30 g  15-30 g Oral Q15 Min PRN Irma Rodgers MD        Or     dextrose 50 % injection 25-50 mL  25-50 mL Intravenous Q15 Min PRN Irma Rodgers MD        Or     glucagon injection 1 mg  1 mg Subcutaneous Q15 Min PRN Irma Rodgers MD         No MMR Needed - Assessment: Patient does not need MMR vaccine   Does not apply Continuous PRN Irma Rodgers MD         insulin aspart (NovoLOG) inj (RAPID ACTING)  1-3 Units Subcutaneous TID AC Irma Rodgers MD   0 Units at 03/09/17 2029     insulin aspart (NovoLOG) inj (RAPID ACTING)  1-3 Units Subcutaneous At Bedtime Irma Rodgers MD   0 Units at 03/10/17 0306     ondansetron (ZOFRAN) tablet 4 mg  4 mg Oral Q6H PRN Belkis Stewart MD   4 mg at 03/09/17 1601     buprenorphine HCl-naloxone HCl (SUBOXONE) 8-2 MG per film 1 Film  1 Film Sublingual Daily Irma Rodgers MD   1 Film at 03/10/17 0808     ranitidine (ZANTAC) tablet 150 mg  150 mg Oral BID Irma Rodgers MD   150 mg at 03/10/17 0809     sertraline (ZOLOFT) tablet 50 mg  50 mg Oral Daily Irma Rodgers MD   50 mg at 03/10/17 0808     buprenorphine HCl-naloxone HCl (SUBOXONE) 4-1 MG per film 1 Film  1 Film Sublingual Daily Irma Rodgers MD   1 Film at 03/09/17 2022     No current Epic-ordered outpatient prescriptions on file.       PHYSICAL EXAM:    Vitals: T 98.2, P 73, /69, R 16, SpO2 99%, Weight   Wt Readings from Last 2 Encounters:   02/13/17 77.6 kg (171 lb)   02/05/17 75.8 kg (167 lb)       See doc flowsheet    Temp: 36.8  C (98.2  F) Temp  Min: 36.6  C (97.8  F)  Max: 36.8  C (98.3  F)  Resp: 16 Resp  Min: 16  Max: 18  SpO2: 99 % SpO2  Min: 98 %  Max: 100 %  Pulse: 73 Pulse  Min: 73  Max: 73  Heart Rate:  85 Heart Rate  Min: 80  Max: 85  BP: 114/69 Systolic (24hrs), Av , Min:103 , Max:131   Diastolic (24hrs), Av, Min:60, Max:84      NPO STATUS: see doc flowsheet    LABS:    BMP:  Recent Labs   Lab Test  17   0900  17   0412   NA   --   141   POTASSIUM   --   3.6   CHLORIDE   --   108   CO2   --   21   BUN   --   6*   CR  0.59  0.45*   GLC   --   116*   CLAUDETTE   --   8.2*       LFTs:   Recent Labs   Lab Test  17   0900  17   0412   PROTTOTAL   --   6.7*   ALBUMIN   --   2.4*   BILITOTAL   --   0.4   ALKPHOS   --   104   AST  17  15   ALT  10  15       CBC:   Recent Labs   Lab Test  03/10/17   0736  17   0900   WBC   --   4.9   RBC   --   3.81   HGB  9.4*  9.7*   HCT   --   30.1*   MCV   --   79   MCH   --   25.5*   MCHC   --   32.2   RDW   --   13.9   PLT   --   283       Coags:  No results for input(s): INR, PTT, FIBR in the last [id number] hours.    Imaging:  No orders to display       Akin Strong MD  Anesthesiology Staff  Pager [phone]    3/10/2017  11:26 AM

## 2017-03-11 LAB
GLUCOSE BLDC GLUCOMTR-MCNC: 123 MG/DL (ref 70–99)
GLUCOSE BLDC GLUCOMTR-MCNC: 73 MG/DL (ref 70–99)
GLUCOSE BLDC GLUCOMTR-MCNC: 74 MG/DL (ref 70–99)
GLUCOSE BLDC GLUCOMTR-MCNC: 81 MG/DL (ref 70–99)

## 2017-03-11 PROCEDURE — 25000125 ZZHC RX 250: Performed by: OBSTETRICS & GYNECOLOGY

## 2017-03-11 PROCEDURE — 25000132 ZZH RX MED GY IP 250 OP 250 PS 637: Performed by: OBSTETRICS & GYNECOLOGY

## 2017-03-11 PROCEDURE — 12000030 ZZH R&B OB INTERMEDIATE UMMC

## 2017-03-11 PROCEDURE — 00000146 ZZHCL STATISTIC GLUCOSE BY METER IP

## 2017-03-11 PROCEDURE — 25000131 ZZH RX MED GY IP 250 OP 636 PS 637: Performed by: OBSTETRICS & GYNECOLOGY

## 2017-03-11 RX ORDER — HYDROMORPHONE HYDROCHLORIDE 1 MG/ML
.3-.5 INJECTION, SOLUTION INTRAMUSCULAR; INTRAVENOUS; SUBCUTANEOUS
Status: DISCONTINUED | OUTPATIENT
Start: 2017-03-11 | End: 2017-03-12 | Stop reason: HOSPADM

## 2017-03-11 RX ORDER — METOCLOPRAMIDE 10 MG/1
10 TABLET ORAL EVERY 6 HOURS PRN
Status: DISCONTINUED | OUTPATIENT
Start: 2017-03-11 | End: 2017-03-12 | Stop reason: HOSPADM

## 2017-03-11 RX ORDER — METOCLOPRAMIDE HYDROCHLORIDE 5 MG/ML
10 INJECTION INTRAMUSCULAR; INTRAVENOUS EVERY 6 HOURS PRN
Status: DISCONTINUED | OUTPATIENT
Start: 2017-03-11 | End: 2017-03-12 | Stop reason: HOSPADM

## 2017-03-11 RX ORDER — OXYCODONE HYDROCHLORIDE 5 MG/1
5-10 TABLET ORAL EVERY 4 HOURS PRN
Status: DISCONTINUED | OUTPATIENT
Start: 2017-03-11 | End: 2017-03-12 | Stop reason: HOSPADM

## 2017-03-11 RX ORDER — ONDANSETRON 2 MG/ML
4-8 INJECTION INTRAMUSCULAR; INTRAVENOUS EVERY 6 HOURS PRN
Status: DISCONTINUED | OUTPATIENT
Start: 2017-03-11 | End: 2017-03-12 | Stop reason: HOSPADM

## 2017-03-11 RX ADMIN — BUPRENORPHINE HYDROCHLORIDE, NALOXONE HYDROCHLORIDE 1 FILM: 4; 1 FILM, SOLUBLE BUCCAL; SUBLINGUAL at 18:10

## 2017-03-11 RX ADMIN — ACETAMINOPHEN 650 MG: 325 TABLET, FILM COATED ORAL at 01:10

## 2017-03-11 RX ADMIN — ONDANSETRON HYDROCHLORIDE 4 MG: 4 TABLET, FILM COATED ORAL at 01:10

## 2017-03-11 RX ADMIN — BUPRENORPHINE HYDROCHLORIDE, NALOXONE HYDROCHLORIDE 1 FILM: 8; 2 FILM, SOLUBLE BUCCAL; SUBLINGUAL at 06:47

## 2017-03-11 RX ADMIN — SENNOSIDES AND DOCUSATE SODIUM 2 TABLET: 8.6; 5 TABLET ORAL at 12:37

## 2017-03-11 RX ADMIN — IBUPROFEN 800 MG: 400 TABLET ORAL at 01:10

## 2017-03-11 RX ADMIN — SERTRALINE HYDROCHLORIDE 50 MG: 50 TABLET ORAL at 12:38

## 2017-03-11 RX ADMIN — RANITIDINE 150 MG: 150 TABLET ORAL at 12:38

## 2017-03-11 RX ADMIN — IBUPROFEN 800 MG: 400 TABLET ORAL at 20:10

## 2017-03-11 RX ADMIN — SENNOSIDES AND DOCUSATE SODIUM 2 TABLET: 8.6; 5 TABLET ORAL at 20:10

## 2017-03-11 RX ADMIN — OXYCODONE HYDROCHLORIDE 5 MG: 5 TABLET ORAL at 17:04

## 2017-03-11 RX ADMIN — OXYCODONE HYDROCHLORIDE 10 MG: 5 TABLET ORAL at 20:54

## 2017-03-11 RX ADMIN — METOCLOPRAMIDE 10 MG: 10 TABLET ORAL at 06:00

## 2017-03-11 RX ADMIN — ONDANSETRON HYDROCHLORIDE 4 MG: 4 TABLET, FILM COATED ORAL at 17:04

## 2017-03-11 NOTE — PLAN OF CARE
Problem: Goal Outcome Summary  Goal: Goal Outcome Summary  Outcome: No Change  VSS and assessments WDL. Up ad martínez and voiding freely. BG were within 74-93 during shift, Provider aware. Patient was nauseous and vomiting during shift, refused oral pain meds and requested IV medication. Continue to monitor.

## 2017-03-11 NOTE — PROVIDER NOTIFICATION
Prescriber Notification Note    The pharmacist has communicated with this patient's provider regarding a concern or therapy recommendation.    Notified Person: OB/GYN resident  Date/Time of Notification:  3/10/2017 ~6pm  Interaction: phone  Concern/Recommendation: notified provider about interaction between oxycodone and Suboxone.      Comments/Additional Details: other pain management options include: increasing Tylenol dose and providing it on a scheduled basis, increasing Ibuprofen.   Meri Pittman, MagenD, BCPS March 10, 2017

## 2017-03-11 NOTE — PLAN OF CARE
Problem: Postpartum, Vaginal Delivery (Adult)  Goal: Signs and Symptoms of Listed Potential Problems Will be Absent or Manageable (Postpartum, Vaginal Delivery)  Signs and symptoms of listed potential problems will be absent or manageable by discharge/transition of care (reference Postpartum, Vaginal Delivery (Adult) CPG).   Outcome: Therapy, progress toward functional goals as expected  Data: Vital signs within normal limits. Postpartum checks within normal limits - see flow record. Patient eating and drinking normally. Patient able to empty bladder independently and is up ambulating periodically. No apparent signs of infection. Perineum and abdominal incisions from bilateral tubal ligation healing well. Patient performing self cares and is able to care for infant. However, patient has remained in bed throughout the majority of the day and has delegated all  cares to her older daughter and the nursing staff. No bonding between  and patient noted throughout shift.   Action: Patient unmedicated throughout the shift for pain. Patient voicing she is comfortable and declining medication administration.   Response: Support persons present.   Plan: Anticipate discharge on  or .

## 2017-03-11 NOTE — PROGRESS NOTES
Postpartum Progress Note    S: Ms. Cindy Fisher is a 41 year old  POD #1 for bilateral tubal ligation and PPD#2 s/p  who has a history of uncontrolled type II diabetes and asthma  who has had nausea and vomiting overnight. In addition, she also had substernal pain last night for which she was evaluated and had normal labs and EKG. Relieved with TUMS. She states that she has back pain where the epidural was and no feels more lower abdominal pain. She has been ambulating unassisted and voiding spontaneously. Passing flatus but no BM yet.        O:  Vitals:    03/10/17 1645 03/10/17 1747 17 0105 17 1000   BP: 116/75 113/71 124/80 138/90   Pulse:    81   Resp: 16 16 16 16   Temp:   97.8  F (36.6  C) 98.2  F (36.8  C)   TempSrc:   Oral Oral   SpO2: 98% 96% 100% 98%       Gen: Appears uncomfortable walking to bed but able to do is slowly    CV:  RRR, no m/r/g auscultated  Pulm: CTAB, no w/r/r auscultated  Abd: Soft, mildly-tender around incision site, non-distended  Ext: No LE edema, extremities warm and well perfused    Labs:    Recent Labs  Lab 17  1045 03/10/17  2217 03/10/17  2158 03/10/17  2137 03/10/17  2119 03/10/17  2103  03/10/17  1946   GLC  --   --   --   --   --   --   --  84   BGM 73 80 76 76 74 72  < >  --    < > = values in this interval not displayed.    A/P:  Ms. Cindy Fisher is a 41 year old  POD #1 for bilateral tubal ligation and PPD#2 s/p  who has a history of uncontrolled type II diabetes and asthma.    - T2DM: Blood glucose as above. Endocrine consulted, agree with Low dose SSI and QID checks. She has not required any SSI post-partum  - Hx of PSA: UDS negative on admission. Continue Suboxone 8mg AM and 4 mg PM. She picks up from [employer] Office that is not open on the weekend so patient may need to stay through  night to receive her dosages.  - HIV exposure: Rapid HIV negative and viral load negative  - Depression: Continue Zoloft  - PNC:  Rh +. Rubella immune. No intervention indicated.  - Chest pain: Stat EKG and labs normal. Relived with TUMS so most likely GERD  - Pain: controlled on oral medications  - Heme: Hgb 9.7> EBL 50> 8.7. Acute blood loss anemia superimposed on chronic anemia. Iron indicated on discharge home. Another Hgb pending  - GI: continue anti-emetics and stool softeners as needed.  - : Voiding spontaneously  - Infant: Stable in room  - Feeding: Bottlefeeding.  - BC: PPTL  - SW following  - Dispo: Will require another day of admission to ensure that her nausea, vomiting improves and her pain is better tolerated.      Janeth Hough MD   OBGYN, PGY-3  3/11/2017 12:11 PM    I have seen and examined the patient without the resident. I have reviewed, edited, and agree with the note.   My findings are:sleeping soundly  Fundus firm, 2 cm below umb. Incision CDI  Hemoglobin   Date Value Ref Range Status   03/10/2017 8.7 (L) 11.7 - 15.7 g/dL Final     Caryn Rojas MD

## 2017-03-11 NOTE — PROGRESS NOTES
Pt reports chest pain now resolved.   Continues to have nausea, but improved with zofran. Is very tired and would just like to get some rest. Declines further anti-emetics at this time.   Reviewed work-up of chest pain (normal EKG - reviewed with Cardiology), BMP nl, CBC with low, but stable Hgb - otherwise normal.   Melody Vazquez  OB/GYN Resident  PGY-3

## 2017-03-11 NOTE — PROGRESS NOTES
Discussed pain control plan with cynthia with Psych. Per Dr. Cruz, ok to continue suboxone and treat any post-operative pain with tylenol, ibuprofen and oxycodone. May need a higher dose than usual of oxycodone due to naloxone antagonist. Adjust suboxone dosing as an outpatient at [facility].  Melody Vazquez  OB/GYN Resident  PGY-3

## 2017-03-11 NOTE — PROVIDER NOTIFICATION
03/10/17 1858   Provider Notification   Provider Name/Title G3   Method of Notification Electronic Page   Request Evaluate in Person   Notification Reason Lab Results;Status Update   Pt c/o chest pain, O2 sats 92% on room air. Pt c/o nausea, Zofran given. BG 62, up to 77 after juice.

## 2017-03-11 NOTE — PLAN OF CARE
Problem: Goal Outcome Summary  Goal: Goal Outcome Summary  Outcome: Improving  Patient had tubal this afternoon and back to floor at 1640. She was able to ambulate to bathroom and voided freely. Denies having pain during that time. Glucose checks this pm was 62 and apple juice was given. It went up but did not reach the 100 goal yet so monitoring is continued until 1915 PM. Complained of nausea nd chest pain so G3 was notified. Medical student came up to assess her.  Received call from G3 and glucose monitoring can be done an hour after the last one. Will continue with plan of care.

## 2017-03-11 NOTE — PROGRESS NOTES
Postpartum Progress Note    S: Ms. Cindy Fisher is a 41 year old  POD #0 for bilateral tubal ligation and PPD#1 s/p  who has a history of uncontrolled type II diabetes and asthma  who has been having chest pain for about 15 minutes. She was sitting in bed watching television when she suddenly developed a substernal pain 9/10 in severity with no radiation. Starting around the same time she developed nausea that was amendable to Zofran. The pain has been increasing in intensity since its onset. She denies SOB of breath, fever, or emesis. She does not have a previous cardiac history. She does not believe this pain is reflux related. She denies difficulty swallowing or esophageal spasm type symptoms.      O:  /71  Pulse 73  Temp 98.6  F (37  C) (Oral)  Resp 16  SpO2 96%  Breastfeeding? Unknown     SpO2 95% - 97% for entirety of examination     Gen: Looking uncomfortable, rubbing center of chest   CV:  RRR, no m/r/g auscultated  Pulm: CTAB, no w/r/r auscultated  Abd: Soft, mildly-tender around incision sites, non-distended  Ext: No LE edema, extremities warm and well perfused    STAT EKG: pending       A/P:  Ms. Cindy Fisher is a 41 year old  POD #0 for bilateral tubal ligation and PPD#1 s/p  who has a history of uncontrolled type II diabetes and asthma who has been having increasing substernal chest pain for about 15 minutes.   - Stat EKG to rule out myocardial infarction or ischemia  - Zofran for nausea  - BMP  - CBC    W. Robb Sessions    I agree with the MS note above.   Melody Vazquez  OB/GYN Resident  PGY-3

## 2017-03-12 VITALS
HEART RATE: 88 BPM | SYSTOLIC BLOOD PRESSURE: 118 MMHG | OXYGEN SATURATION: 98 % | RESPIRATION RATE: 18 BRPM | DIASTOLIC BLOOD PRESSURE: 78 MMHG | TEMPERATURE: 98.3 F

## 2017-03-12 LAB
GLUCOSE BLDC GLUCOMTR-MCNC: 109 MG/DL (ref 70–99)
GLUCOSE BLDC GLUCOMTR-MCNC: 80 MG/DL (ref 70–99)

## 2017-03-12 PROCEDURE — 25000132 ZZH RX MED GY IP 250 OP 250 PS 637: Performed by: OBSTETRICS & GYNECOLOGY

## 2017-03-12 PROCEDURE — 25000131 ZZH RX MED GY IP 250 OP 636 PS 637: Performed by: OBSTETRICS & GYNECOLOGY

## 2017-03-12 PROCEDURE — 00000146 ZZHCL STATISTIC GLUCOSE BY METER IP

## 2017-03-12 RX ORDER — OXYCODONE AND ACETAMINOPHEN 5; 325 MG/1; MG/1
1 TABLET ORAL EVERY 4 HOURS PRN
Qty: 10 TABLET | Refills: 0 | Status: SHIPPED | OUTPATIENT
Start: 2017-03-12 | End: 2019-09-05

## 2017-03-12 RX ORDER — AMOXICILLIN 250 MG
1-2 CAPSULE ORAL 2 TIMES DAILY
Qty: 100 TABLET | Refills: 1 | Status: SHIPPED
Start: 2017-03-12 | End: 2019-09-05

## 2017-03-12 RX ORDER — IBUPROFEN 400 MG/1
400-800 TABLET, FILM COATED ORAL EVERY 6 HOURS PRN
Qty: 120 TABLET | Refills: 1 | Status: SHIPPED
Start: 2017-03-12

## 2017-03-12 RX ORDER — FERROUS GLUCONATE 324(38)MG
324 TABLET ORAL
Qty: 100 TABLET | Refills: 1 | Status: SHIPPED
Start: 2017-03-12 | End: 2019-09-05

## 2017-03-12 RX ADMIN — RANITIDINE 150 MG: 150 TABLET ORAL at 09:22

## 2017-03-12 RX ADMIN — ACETAMINOPHEN 650 MG: 325 TABLET, FILM COATED ORAL at 09:17

## 2017-03-12 RX ADMIN — BUPRENORPHINE HYDROCHLORIDE, NALOXONE HYDROCHLORIDE 1 FILM: 8; 2 FILM, SOLUBLE BUCCAL; SUBLINGUAL at 09:12

## 2017-03-12 RX ADMIN — OXYCODONE HYDROCHLORIDE 10 MG: 5 TABLET ORAL at 09:17

## 2017-03-12 RX ADMIN — SERTRALINE HYDROCHLORIDE 50 MG: 50 TABLET ORAL at 09:13

## 2017-03-12 RX ADMIN — BUPRENORPHINE HYDROCHLORIDE, NALOXONE HYDROCHLORIDE 1 FILM: 4; 1 FILM, SOLUBLE BUCCAL; SUBLINGUAL at 16:50

## 2017-03-12 RX ADMIN — SENNOSIDES AND DOCUSATE SODIUM 2 TABLET: 8.6; 5 TABLET ORAL at 09:12

## 2017-03-12 NOTE — PLAN OF CARE
Problem: Goal Outcome Summary  Goal: Goal Outcome Summary  Outcome: Adequate for Discharge Date Met:  03/12/17  Patient's vital signs have remained stable. Patient has been pleasant today. Paying attention to infant cues. Patient has fed infant, changed diapers and held infant. Patient's pain is well managed. Patient has verbalized readiness for discharge. Patient plans to discharge after evening dose of Suboxone. Will continue with plan of care.

## 2017-03-12 NOTE — PROGRESS NOTES
Postpartum Progress Note    S: Ms. Cindy Fisher is very tired this morning; however, she denies any nausea or vomiting, which is a large improvement from yesterday. She denies any chest pain or shortness of breath or dizziness with ambulation. She states her vaginal bleeding is light. She had an EDPS score of 13 and so SW will again see this morning.     O:  Vitals:    17 0105 17 1000 17 1700 17 0104   BP: 124/80 138/90 104/68 123/71   Pulse:  81 88    Resp: 16 16 18 16   Temp: 97.8  F (36.6  C) 98.2  F (36.8  C) 98.2  F (36.8  C) 98  F (36.7  C)   TempSrc: Oral Oral Oral    SpO2: 100% 98%         Gen: Appears comfortable and NAD  CV:  RRR, no m/r/g auscultated  Pulm: CTAB, no w/r/r auscultated  Abd: Soft, mildly-tender around incision site, non-distended  Ext: No LE edema, extremities warm and well perfused    Labs:    Recent Labs  Lab 17  2227 17  1720 17  1215 17  1045 03/10/17  2217 03/10/17  2158  03/10/17  1946   GLC  --   --   --   --   --   --   --  84   * 74 81 73 80 76  < >  --    < > = values in this interval not displayed.    A/P:  Ms. Cindy Fisher is a 41 year old  POD #2 for bilateral tubal ligation and PPD#3 s/p  who has a history of uncontrolled type II diabetes and asthma and suboxone use.    - T2DM: Blood glucose as above. Endocrine consulted, agree with Low dose SSI and QID checks. She has not required any SSI post-partum. Per Endocrine, no meds at discharge with close f/u with PCP with one week.   - Hx of PSA: UDS negative on admission. Continue Suboxone 8mg AM and 4 mg PM. She picks up from [employer] that is not open on the weekend so will need to receive evening dose here prior to discharge  - HIV exposure: Rapid HIV negative and viral load negative  - Depression: Continue Zoloft  - PNC: Rh +. Rubella immune. No intervention indicated.  - Chest pain: Resolved. Stat EKG and labs normal. Relived with TUMS so  most likely GERD  - Pain: controlled on oral medications  - Heme: Hgb 9.7> EBL 50> 8.7. Acute blood loss anemia superimposed on chronic anemia. Iron indicated on discharge home  - GI: continue anti-emetics and stool softeners as needed.  - : Voiding spontaneously  - Infant: Stable in room  - Feeding: Bottlefeeding.  - BC: PPTL  - SW - will see again given EDPS of 13. Increase Zoloft for d/c.   - Dispo: Plan to discharge home tonight after SW evaluation and after receives evening suboxone dose    Janeth Hough MD   OBGYN, PGY-3  3/12/2017 7:53 AM    [facility] staff:  Appreciate note by Dr. Hough.  I have seen and examined the patient without the resident. I have reviewed, edited, and agree with the note.    My findings are: as above.  D/c this PM after suboxone dose.     Mini Garnica MD, FACOG  3/12/2017  10:47 AM

## 2017-03-12 NOTE — PLAN OF CARE
Problem: Goal Outcome Summary  Goal: Goal Outcome Summary  Outcome: Improving  Data: Vital signs within normal limits. Postpartum checks within normal limits - see flow record. Patient eating and drinking normally. Patient able to empty bladder independently and is up ambulating. No apparent signs of infection. perineum healing well. Patient performing self cares and is able to care for infant. Bottle feeding infant with help from staff and pt's daughter, needs reminders to feed infant.  Action: Patient medicated during the shift for pain. See MAR. Patient reassessed within 1 hour after each medication and pain was improved - patient stated she was comfortable. Patient education done about infant feedings, pain management, diabetes management, plan of care, etc. See flow record.  Response: Positive attachment behaviors observed with infant. Support persons present.   Plan: Anticipate discharge on POD #3-4.

## 2017-03-12 NOTE — PROGRESS NOTES
After Hours Social Work Note:    D: Per chart review, Ms. Cindy Fisher is a 41 year old now  at 36w0d, admitted for induction of labor for asymmetric IUGR, abnormal dopplers and oligohydramnios. Pregnancy was complicated by: Type II DM, IUGR, oligohydramnios, abnormal dopplers, polysubstance abuse on suboxone, Depression on zoloft, AMA, PAP with ASCUS, fetus with VSD and increase Trisomy 21 risk and abnormal U/S findings suggestive of trisomy 21, unknown HIV status with FOB with AIDS.    I: SW was paged regarding a score of 13 on pt's Wendell scale. Pt has extensive social history.     A: SW made phone contact with the pt, who declined SW coming in to the hospital to meet. Pt stated that she has counseling in place through her clinic and that she is on medication for depression and anxiety. She reported no current thoughts of self-harm. Pt reported feeling good and that she is looking forward to being discharged today.     P: SW offered post-partum and other MH resources, however, pt declined. SW to follow-up if further needs arise.     Aliza Tim [facility]  Casual   3/12/2017

## 2017-03-12 NOTE — PLAN OF CARE
Problem: Individualization  Goal: Patient Preferences  Outcome: Completed Date Met:  03/12/17  Data: Vital signs within normal limits. Postpartum checks within normal limits - see flow record. Patient eating and drinking normally. Patient able to empty bladder independently and is up ambulating. No apparent signs of infection. vaginal tear healing well. Patient performing self cares and is able to care for infant.  Action: Patient medicated during the shift for pain. See MAR. Patient reassessed within 1 hour after each medication and pain was improved - patient stated she was comfortable. Patient education done about discharging home- care for herself and baby. See flow record.  Response: Positive attachment behaviors observed with infant. Support persons daughter present.   Plan: Anticipate discharge on now.     Discharge medication reviewed, discharge instructions reviewed- no further questions from patient. Bands checked. Evening dose of suboxone given early as patient requesting to leave early.  Patient and baby discharged at 1655.

## 2017-03-12 NOTE — PLAN OF CARE
Problem: Goal Outcome Summary  Goal: Goal Outcome Summary  Outcome: Improving  Data: Vital signs within normal limits. Postpartum checks within normal limits - see flow record. Patient eating and drinking normally. Patient able to empty bladder independently and is up ambulating. Patient performing self cares.  Action: Patient medicated during the shift for abdomen pain at incision site. See MAR. Patient reassessed within 1 hour after each medication and pain was improved - patient stated she was comfortable.     Mother appears to be distant from , she looks at  with a slight smile at times but does appear to holding  overnight. Did not participate in feedings, did not change diapers, did not hold  overnight. Mother is not demonstrating an ability to meet newborns basic needs after being highly encouraged to do so and educated on the importance.

## 2017-03-12 NOTE — PLAN OF CARE
Problem: Individualization  Goal: Patient Preferences  Outcome: No Change  *Late Entry     Postpartum checks WNL this shift. Insulin not needed as BG not meeting criteria for intervention (see flowsheet). Pain managed with ibuprofen and tylenol- patient did take one oxycodone this shift in addition for increased pain and stated it was effective.     Patient needing several reminders to feed baby every 2-3 hours and change baby's diaper. Baby found in soiled diaper and showing S/S of hunger. Education done about importance of feeding baby more often and aggressively due to 8% weight loss. Patient rarely holding infant, RNs doing most feedings. Will continue to monitor.

## 2017-03-14 LAB — INTERPRETATION ECG - MUSE: NORMAL

## 2017-03-16 LAB — COPATH REPORT: NORMAL

## 2017-11-24 NOTE — PLAN OF CARE
Pt given abdominal binder for support. Discharge instructions explained and understood. DC to home.   Never

## 2019-03-07 NOTE — PROGRESS NOTES
"Please see \"Imaging\" tab under \"Chart Review\" for details of today's US at the [facility].    Teodoro Ace MD  Maternal-Fetal Medicine      " Linear

## 2019-09-05 ENCOUNTER — HOSPITAL ENCOUNTER (EMERGENCY)
Facility: CLINIC | Age: 44
Discharge: HOME | End: 2019-09-05
Attending: EMERGENCY MEDICINE | Admitting: EMERGENCY MEDICINE
Payer: COMMERCIAL

## 2019-09-05 ENCOUNTER — APPOINTMENT (OUTPATIENT)
Dept: GENERAL RADIOLOGY | Facility: CLINIC | Age: 44
End: 2019-09-05
Attending: EMERGENCY MEDICINE
Payer: COMMERCIAL

## 2019-09-05 VITALS
BODY MASS INDEX: 28.63 KG/M2 | DIASTOLIC BLOOD PRESSURE: 94 MMHG | WEIGHT: 167.7 LBS | SYSTOLIC BLOOD PRESSURE: 145 MMHG | RESPIRATION RATE: 16 BRPM | HEART RATE: 76 BPM | TEMPERATURE: 98.1 F | OXYGEN SATURATION: 99 % | HEIGHT: 64 IN

## 2019-09-05 DIAGNOSIS — S91.001A OPEN WOUND OF KNEE, LEG, AND ANKLE, RIGHT, INITIAL ENCOUNTER: ICD-10-CM

## 2019-09-05 DIAGNOSIS — S81.001A OPEN WOUND OF KNEE, LEG, AND ANKLE, RIGHT, INITIAL ENCOUNTER: ICD-10-CM

## 2019-09-05 DIAGNOSIS — L03.115 CELLULITIS OF RIGHT LOWER EXTREMITY: ICD-10-CM

## 2019-09-05 DIAGNOSIS — S81.801A OPEN WOUND OF KNEE, LEG, AND ANKLE, RIGHT, INITIAL ENCOUNTER: ICD-10-CM

## 2019-09-05 LAB
ALBUMIN SERPL-MCNC: 3.8 G/DL (ref 3.4–5)
ALP SERPL-CCNC: 113 U/L (ref 40–150)
ALT SERPL W P-5'-P-CCNC: 54 U/L (ref 0–50)
ANION GAP SERPL CALCULATED.3IONS-SCNC: 9 MMOL/L (ref 3–14)
AST SERPL W P-5'-P-CCNC: 25 U/L (ref 0–45)
BASOPHILS # BLD AUTO: 0 10E9/L (ref 0–0.2)
BASOPHILS NFR BLD AUTO: 0.6 %
BILIRUB SERPL-MCNC: 0.2 MG/DL (ref 0.2–1.3)
BUN SERPL-MCNC: 16 MG/DL (ref 7–30)
CALCIUM SERPL-MCNC: 8.6 MG/DL (ref 8.5–10.1)
CHLORIDE SERPL-SCNC: 100 MMOL/L (ref 94–109)
CO2 SERPL-SCNC: 23 MMOL/L (ref 20–32)
CREAT SERPL-MCNC: 0.55 MG/DL (ref 0.52–1.04)
CRP SERPL-MCNC: <2.9 MG/L (ref 0–8)
DIFFERENTIAL METHOD BLD: ABNORMAL
EOSINOPHIL # BLD AUTO: 0.1 10E9/L (ref 0–0.7)
EOSINOPHIL NFR BLD AUTO: 1.3 %
ERYTHROCYTE [DISTWIDTH] IN BLOOD BY AUTOMATED COUNT: 15.9 % (ref 10–15)
ERYTHROCYTE [SEDIMENTATION RATE] IN BLOOD BY WESTERGREN METHOD: 3 MM/H (ref 0–20)
GFR SERPL CREATININE-BSD FRML MDRD: >90 ML/MIN/{1.73_M2}
GLUCOSE BLDC GLUCOMTR-MCNC: 293 MG/DL (ref 70–99)
GLUCOSE BLDC GLUCOMTR-MCNC: 366 MG/DL (ref 70–99)
GLUCOSE BLDC GLUCOMTR-MCNC: 475 MG/DL (ref 70–99)
GLUCOSE SERPL-MCNC: 425 MG/DL (ref 70–99)
HCT VFR BLD AUTO: 35.7 % (ref 35–47)
HGB BLD-MCNC: 11.6 G/DL (ref 11.7–15.7)
IMM GRANULOCYTES # BLD: 0 10E9/L (ref 0–0.4)
IMM GRANULOCYTES NFR BLD: 0.4 %
LYMPHOCYTES # BLD AUTO: 1.6 10E9/L (ref 0.8–5.3)
LYMPHOCYTES NFR BLD AUTO: 24 %
MCH RBC QN AUTO: 26.2 PG (ref 26.5–33)
MCHC RBC AUTO-ENTMCNC: 32.5 G/DL (ref 31.5–36.5)
MCV RBC AUTO: 81 FL (ref 78–100)
MONOCYTES # BLD AUTO: 0.4 10E9/L (ref 0–1.3)
MONOCYTES NFR BLD AUTO: 5.5 %
NEUTROPHILS # BLD AUTO: 4.6 10E9/L (ref 1.6–8.3)
NEUTROPHILS NFR BLD AUTO: 68.2 %
NRBC # BLD AUTO: 0 10*3/UL
NRBC BLD AUTO-RTO: 0 /100
PLATELET # BLD AUTO: 425 10E9/L (ref 150–450)
POTASSIUM SERPL-SCNC: 4 MMOL/L (ref 3.4–5.3)
PROT SERPL-MCNC: 8.3 G/DL (ref 6.8–8.8)
RBC # BLD AUTO: 4.42 10E12/L (ref 3.8–5.2)
SODIUM SERPL-SCNC: 132 MMOL/L (ref 133–144)
WBC # BLD AUTO: 6.7 10E9/L (ref 4–11)

## 2019-09-05 PROCEDURE — 99284 EMERGENCY DEPT VISIT MOD MDM: CPT | Mod: Z6 | Performed by: EMERGENCY MEDICINE

## 2019-09-05 PROCEDURE — 00000146 ZZHCL STATISTIC GLUCOSE BY METER IP

## 2019-09-05 PROCEDURE — 80053 COMPREHEN METABOLIC PANEL: CPT | Performed by: EMERGENCY MEDICINE

## 2019-09-05 PROCEDURE — 96360 HYDRATION IV INFUSION INIT: CPT | Performed by: EMERGENCY MEDICINE

## 2019-09-05 PROCEDURE — 85025 COMPLETE CBC W/AUTO DIFF WBC: CPT | Performed by: EMERGENCY MEDICINE

## 2019-09-05 PROCEDURE — 25800030 ZZH RX IP 258 OP 636: Performed by: EMERGENCY MEDICINE

## 2019-09-05 PROCEDURE — 96361 HYDRATE IV INFUSION ADD-ON: CPT | Performed by: EMERGENCY MEDICINE

## 2019-09-05 PROCEDURE — 73590 X-RAY EXAM OF LOWER LEG: CPT | Mod: RT

## 2019-09-05 PROCEDURE — 99284 EMERGENCY DEPT VISIT MOD MDM: CPT | Mod: 25 | Performed by: EMERGENCY MEDICINE

## 2019-09-05 PROCEDURE — 25000128 H RX IP 250 OP 636: Performed by: EMERGENCY MEDICINE

## 2019-09-05 PROCEDURE — 85652 RBC SED RATE AUTOMATED: CPT | Performed by: EMERGENCY MEDICINE

## 2019-09-05 PROCEDURE — 86140 C-REACTIVE PROTEIN: CPT | Performed by: EMERGENCY MEDICINE

## 2019-09-05 RX ORDER — CEPHALEXIN 500 MG/1
500 CAPSULE ORAL 4 TIMES DAILY
Qty: 28 CAPSULE | Refills: 0 | Status: SHIPPED | OUTPATIENT
Start: 2019-09-05 | End: 2019-09-10

## 2019-09-05 RX ORDER — SODIUM CHLORIDE 9 MG/ML
INJECTION, SOLUTION INTRAVENOUS CONTINUOUS
Status: DISCONTINUED | OUTPATIENT
Start: 2019-09-05 | End: 2019-09-05 | Stop reason: HOSPADM

## 2019-09-05 RX ADMIN — SODIUM CHLORIDE 1000 ML: 9 INJECTION, SOLUTION INTRAVENOUS at 18:44

## 2019-09-05 RX ADMIN — SODIUM CHLORIDE 1000 ML: 9 INJECTION, SOLUTION INTRAVENOUS at 20:22

## 2019-09-05 ASSESSMENT — MIFFLIN-ST. JEOR: SCORE: 1395.68

## 2019-09-05 NOTE — ED PROVIDER NOTES
"  History     Chief Complaint   Patient presents with     Wound Infection     Wound on right leg started today. Increasing pain, no drainage. Patient is DM.      HPI  Cindy Fisher is a 44 year old female with a past history of type 2 diabetes mellitus who presents the emergency department with chief complaint of a right leg wound.  She first noticed it today.  The pain has been increasing.  Patient denies any drainage.  Patient has not been compliant with her diabetes medications for a couple of years.  She does have a follow-up appointment scheduled    I have reviewed the Medications, Allergies, Past Medical and Surgical History, and Social History in the Epic system.    Review of Systems    General: No fevers or chills  Skin: No rash or diaphoresis, positive for wound to right leg  Eyes: No eye redness or discharge  Ears/Nose/Throat: No rhinorrhea or nasal congestion  Respiratory: No cough or SOB  Cardiovascular: No chest pain or palpitations  Gastrointestinal: No nausea, vomiting, or diarrhea  Genitourinary: No urinary frequency, hematuria, or dysuria  Musculoskeletal: No arthralgias or myalgias  Neurologic: No numbness or weakness  Psychiatric: No depression or SI  Hematologic/Lymphatic/Immunologic: No leg swelling, no easy bruising/bleeding  Endocrine: No polyuria/polydypsia, positive for history of diabetes     Physical Exam   BP: 137/86  Heart Rate: 104  Temp: 97.1  F (36.2  C)  Resp: 16  Height: 162.6 cm (5' 4\")  Weight: 76.1 kg (167 lb 11.2 oz)  SpO2: 100 %      Physical Exam    General: Well nourished, well developed, NAD  HEENT: EOMI, anicteric. NCAT, MMM  Neck: no jugular venous distension, supple, nl ROM  Cardiac: Regular rate and rhythm. No murmurs, rubs, or gallops. Normal S1, S2.  Intact peripheral pulses  Pulm: CTAB, no stridor, wheezes, rales, rhonchi  Abd: Soft, nontender, nondistended.  No masses palpated.    Skin: Warm and dry to the touch.  Small wound to his shin of right leg with small " area of surrounding erythema without drainage or fluctuance  Extremities: No LE edema, no cyanosis, w/w/p  Neuro: A&Ox3, no gross focal deficits       ED Course        Procedures             Critical Care time:  none             Labs Ordered and Resulted from Time of ED Arrival Up to the Time of Departure from the ED   CBC WITH PLATELETS DIFFERENTIAL - Abnormal; Notable for the following components:       Result Value    Hemoglobin 11.6 (*)     MCH 26.2 (*)     RDW 15.9 (*)     All other components within normal limits   COMPREHENSIVE METABOLIC PANEL - Abnormal; Notable for the following components:    Sodium 132 (*)     Glucose 425 (*)     ALT 54 (*)     All other components within normal limits   GLUCOSE BY METER - Abnormal; Notable for the following components:    Glucose 475 (*)     All other components within normal limits   GLUCOSE BY METER - Abnormal; Notable for the following components:    Glucose 366 (*)     All other components within normal limits   GLUCOSE MONITOR NURSING POCT   CRP INFLAMMATION   ERYTHROCYTE SEDIMENTATION RATE AUTO          Results for orders placed or performed during the hospital encounter of 09/05/19 (from the past 24 hour(s))   Glucose by meter   Result Value Ref Range    Glucose 475 (H) 70 - 99 mg/dL   CBC with platelets differential   Result Value Ref Range    WBC 6.7 4.0 - 11.0 10e9/L    RBC Count 4.42 3.8 - 5.2 10e12/L    Hemoglobin 11.6 (L) 11.7 - 15.7 g/dL    Hematocrit 35.7 35.0 - 47.0 %    MCV 81 78 - 100 fl    MCH 26.2 (L) 26.5 - 33.0 pg    MCHC 32.5 31.5 - 36.5 g/dL    RDW 15.9 (H) 10.0 - 15.0 %    Platelet Count 425 150 - 450 10e9/L    Diff Method Automated Method     % Neutrophils 68.2 %    % Lymphocytes 24.0 %    % Monocytes 5.5 %    % Eosinophils 1.3 %    % Basophils 0.6 %    % Immature Granulocytes 0.4 %    Nucleated RBCs 0 0 /100    Absolute Neutrophil 4.6 1.6 - 8.3 10e9/L    Absolute Lymphocytes 1.6 0.8 - 5.3 10e9/L    Absolute Monocytes 0.4 0.0 - 1.3 10e9/L     Absolute Eosinophils 0.1 0.0 - 0.7 10e9/L    Absolute Basophils 0.0 0.0 - 0.2 10e9/L    Abs Immature Granulocytes 0.0 0 - 0.4 10e9/L    Absolute Nucleated RBC 0.0    Comprehensive metabolic panel   Result Value Ref Range    Sodium 132 (L) 133 - 144 mmol/L    Potassium 4.0 3.4 - 5.3 mmol/L    Chloride 100 94 - 109 mmol/L    Carbon Dioxide 23 20 - 32 mmol/L    Anion Gap 9 3 - 14 mmol/L    Glucose 425 (H) 70 - 99 mg/dL    Urea Nitrogen 16 7 - 30 mg/dL    Creatinine 0.55 0.52 - 1.04 mg/dL    GFR Estimate >90 >60 mL/min/[1.73_m2]    GFR Estimate If Black >90 >60 mL/min/[1.73_m2]    Calcium 8.6 8.5 - 10.1 mg/dL    Bilirubin Total 0.2 0.2 - 1.3 mg/dL    Albumin 3.8 3.4 - 5.0 g/dL    Protein Total 8.3 6.8 - 8.8 g/dL    Alkaline Phosphatase 113 40 - 150 U/L    ALT 54 (H) 0 - 50 U/L    AST 25 0 - 45 U/L   CRP inflammation   Result Value Ref Range    CRP Inflammation <2.9 0.0 - 8.0 mg/L   Erythrocyte sedimentation rate auto   Result Value Ref Range    Sed Rate 3 0 - 20 mm/h   XR Tibia & Fibula Right 2 Views    Narrative    RIGHT TIBIA AND FIBULA TWO VIEWS  9/5/2019 7:07 PM    HISTORY: Wound.    COMPARISON:  None.    FINDINGS:  No fracture or osseous lesion is seen. The joint spaces are  well preserved. No soft tissue pathology is seen.      Impression    IMPRESSION:  Unremarkable examination.    DEBRA BATES MD   Glucose by meter   Result Value Ref Range    Glucose 366 (H) 70 - 99 mg/dL       Labs and imaging studies were reviewed by me.    Assessments & Plan (with Medical Decision Making)   The patient is a 44-year-old female presenting to the emergency department with chief complaint of right leg wound.  Patient also found to be hyperglycemic on arrival.  2 L of IV saline ordered.  Blood sugars less than 300 after this was administered.  No evidence for DKA on labs.  No signs for severe underlying infection on laboratory work-up either, white blood cell count, CRP, and sedimentation rate are within normal limits.   Patient does have minimal signs of infection surrounding wound on her right shin, will plan to treat with antibiotics given patient's untreated diabetes.    I have reviewed the nursing notes.    I have reviewed the findings, diagnosis, plan and need for follow up with the patient.  Patient to be discharged home. Advised to follow up with PCP within 1 week. To return to ER immediately with any new/worsening symptoms. Plan of care discussed with patient and their family. They agree with plan of care.  Prescription for Keflex given    New Prescriptions    CEPHALEXIN (KEFLEX) 500 MG CAPSULE    Take 1 capsule (500 mg) by mouth 4 times daily for 5 days       Final diagnoses:   Open wound of knee, leg, and ankle, right, initial encounter   Cellulitis of right lower extremity       9/5/2019   [facility], [place], EMERGENCY DEPARTMENT     Jeanette Ireland MD  09/05/19 [id number]

## 2019-09-05 NOTE — DISCHARGE INSTRUCTIONS
TODAY'S VISIT:  You were seen today for right leg wound  -   - If you had any labs or imaging/radiology tests performed today, you should also discuss these tests with your usual provider.     FOLLOW-UP:  Please make an appointment to follow up with:  - Your Primary Care Provider in 7 days.    - Have your provider review the results from today's visit with you again to make sure no further follow-up or additional testing is needed based on those results.     PRESCRIPTIONS / MEDICATIONS:  - keflex    OTHER INSTRUCTIONS:  - Do your best to stay hydrated.    RETURN TO THE EMERGENCY DEPARTMENT  Return to the Emergency Department at any time for any new or worsening symptoms or any concerns.

## 2019-09-05 NOTE — ED AVS SNAPSHOT
[facility], [place], Emergency Department  [address]  [place] [address]  Phone:  [phone]  Fax:  [phone]                                    Cindy Fisher   MRN: [id number]    Department:  [facility], Emergency Department   Date of Visit:  9/5/2019           After Visit Summary Signature Page    I have received my discharge instructions, and my questions have been answered. I have discussed any challenges I see with this plan with the nurse or doctor.    ..........................................................................................................................................  Patient/Patient Representative Signature      ..........................................................................................................................................  Patient Representative Print Name and Relationship to Patient    ..................................................               ................................................  Date                                   Time    ..........................................................................................................................................  Reviewed by Signature/Title    ...................................................              ..............................................  Date                                               Time          22EPIC Rev 08/18

## 2020-11-11 ENCOUNTER — TRANSCRIBE ORDERS (OUTPATIENT)
Dept: OTHER | Age: 45
End: 2020-11-11

## 2020-11-11 ENCOUNTER — MEDICAL CORRESPONDENCE (OUTPATIENT)
Dept: HEALTH INFORMATION MANAGEMENT | Facility: CLINIC | Age: 45
End: 2020-11-11

## 2020-11-11 DIAGNOSIS — M25.512 LEFT SHOULDER PAIN: Primary | ICD-10-CM

## 2020-11-11 DIAGNOSIS — M79.644 PAIN IN RIGHT FINGER(S): Primary | ICD-10-CM

## 2021-01-04 ENCOUNTER — TRANSFERRED RECORDS (OUTPATIENT)
Dept: HEALTH INFORMATION MANAGEMENT | Facility: CLINIC | Age: 46
End: 2021-01-04

## 2021-01-27 ENCOUNTER — MEDICAL CORRESPONDENCE (OUTPATIENT)
Dept: HEALTH INFORMATION MANAGEMENT | Facility: CLINIC | Age: 46
End: 2021-01-27

## 2021-04-29 ENCOUNTER — TRANSCRIBE ORDERS (OUTPATIENT)
Dept: OTHER | Age: 46
End: 2021-04-29

## 2021-04-29 DIAGNOSIS — G90.529: Primary | ICD-10-CM

## 2021-05-01 ENCOUNTER — PRE VISIT (OUTPATIENT)
Dept: NEUROLOGY | Facility: CLINIC | Age: 46
End: 2021-05-01

## 2021-05-01 NOTE — TELEPHONE ENCOUNTER
FUTURE VISIT INFORMATION      FUTURE VISIT INFORMATION:    Date: 5/5/2021    Time: 330pm    Location: [facility]  REFERRAL INFORMATION:    Referring provider:  Farideh Cabello NP     Referring providers clinic:  [facility]     Reason for visit/diagnosis  Complex Regional Pain Syndrome     RECORDS REQUESTED FROM:       Clinic name Comments Records Status Imaging Status   [facility]   Scanned to Chart N/A          [facility]   Requested  Requested                        5/1/2021-Request for records and Images faxed to [facility]-MR @ 638am    5/5/2021-[facility] Records scanned to chart-MR @ 604am

## 2021-05-24 ENCOUNTER — HOSPITAL ENCOUNTER (EMERGENCY)
Facility: CLINIC | Age: 46
Discharge: HOME | End: 2021-05-25
Attending: EMERGENCY MEDICINE | Admitting: EMERGENCY MEDICINE
Payer: COMMERCIAL

## 2021-05-24 DIAGNOSIS — K08.89 PAIN, DENTAL: ICD-10-CM

## 2021-05-24 PROCEDURE — 99283 EMERGENCY DEPT VISIT LOW MDM: CPT | Performed by: EMERGENCY MEDICINE

## 2021-05-24 ASSESSMENT — MIFFLIN-ST. JEOR: SCORE: 1420.17

## 2021-05-25 ENCOUNTER — RECORDS - HEALTHEAST (OUTPATIENT)
Dept: ADMINISTRATIVE | Facility: CLINIC | Age: 46
End: 2021-05-25

## 2021-05-25 VITALS
WEIGHT: 174.2 LBS | DIASTOLIC BLOOD PRESSURE: 96 MMHG | HEIGHT: 64 IN | OXYGEN SATURATION: 100 % | BODY MASS INDEX: 29.74 KG/M2 | RESPIRATION RATE: 18 BRPM | HEART RATE: 73 BPM | TEMPERATURE: 98.7 F | SYSTOLIC BLOOD PRESSURE: 157 MMHG

## 2021-05-25 PROCEDURE — 250N000013 HC RX MED GY IP 250 OP 250 PS 637: Performed by: EMERGENCY MEDICINE

## 2021-05-25 RX ORDER — OXYCODONE AND ACETAMINOPHEN 5; 325 MG/1; MG/1
1 TABLET ORAL ONCE
Status: COMPLETED | OUTPATIENT
Start: 2021-05-25 | End: 2021-05-25

## 2021-05-25 RX ORDER — PENICILLIN V POTASSIUM 500 MG/1
500 TABLET, FILM COATED ORAL 4 TIMES DAILY
Qty: 40 TABLET | Refills: 0 | Status: SHIPPED | OUTPATIENT
Start: 2021-05-25 | End: 2021-06-04

## 2021-05-25 RX ORDER — PENICILLIN V POTASSIUM 500 MG/1
500 TABLET, FILM COATED ORAL ONCE
Status: COMPLETED | OUTPATIENT
Start: 2021-05-25 | End: 2021-05-25

## 2021-05-25 RX ADMIN — OXYCODONE HYDROCHLORIDE AND ACETAMINOPHEN 1 TABLET: 5; 325 TABLET ORAL at 01:05

## 2021-05-25 RX ADMIN — PENICILLIN V POTASSIUM 500 MG: 500 TABLET, FILM COATED ORAL at 01:05

## 2021-05-25 NOTE — DISCHARGE INSTRUCTIONS
TODAY'S VISIT:  You were seen today for dental pain   -   - If you had any labs or imaging/radiology tests performed today, you should also discuss these tests with your usual provider.     FOLLOW-UP:  Please make an appointment to follow up with:  - Your Primary Care Provider. If you do not have a PCP, please call the Primary Care Center (phone: [phone] for an appointment  - Dental - Immediate [facility] (phone: [phone]) ASAP    - Have your provider review the results from today's visit with you again to make sure no further follow-up or additional testing is needed based on those results.     PRESCRIPTIONS / MEDICATIONS:  - Penicillin    RETURN TO THE EMERGENCY DEPARTMENT  Return to the Emergency Department at any time for any new or worsening symptoms or any concerns.

## 2021-05-25 NOTE — ED PROVIDER NOTES
History     Chief Complaint   Patient presents with     Dental Pain     c/o dental pain. Left upper. Onset 2 days ago. Took 800 mg Ibuprofen around 2000     HPI  Cindy Fisher is a 45 year old female with a past medical history of type 2 diabetes, opiate withdrawal, anemia, anxiety, postpartum depression, migraines, asthma who presents to the emergency department with a chief complaint of dental pain.  Located in the right and left upper teeth.  Started 2 days ago.  Is associated with some gum pain and swelling.  The patient took 800 mg of ibuprofen around 20:00 today, which did not alleviate her pain.  She has not been able to sleep due to the pain.  No associated fevers.  The patient has had problems with poor dentition in the past and is in the process of having her upper teeth extracted so she can be fitted with dentures.  She states she most recently had some teeth extracted about a year ago.  The sites have healed.  However, she has 3 teeth left on the top and these have become painful.  She also has some dental caries and a chipped tooth on her lower teeth, but these are not quite as painful.  The patient does have a dentist, but is not sure how soon she can get into see them.  She is concerned about infection.  She states she has had similar symptoms in the past that have improved with antibiotics.  However, she is eager to follow-up with a dentist for more definitive management.    I have reviewed the Medications, Allergies, Past Medical and Surgical History, and Social History in the [employer] system.    Past Medical History:   Diagnosis Date     Anemia      Anxiety      Migraine, unspecified, without mention of intractable migraine without mention of status migrainosus     Migraine     Postpartum depression      Type II or unspecified type diabetes mellitus without mention of complication, not stated as uncontrolled summer 2006    Diabetes mellitus     Uncomplicated asthma     with cold sx     Past  "Surgical History:   Procedure Laterality Date     APPENDECTOMY       CHOLECYSTECTOMY       LAPAROTOMY MINI, TUBAL LIGATION (POST PARTUM), COMBINED Bilateral 3/10/2017    Procedure: COMBINED LAPAROTOMY MINI, TUBAL LIGATION (POST PARTUM);  Surgeon: Elinor Reyes MD;  Location: [facility] OR     No current facility-administered medications for this encounter.      Current Outpatient Medications   Medication     blood glucose monitoring (ACCU-CHEK FASTCLIX) lancets     blood glucose monitoring (ACCU-CHEK LYNN SMARTVIEW) meter device kit     ibuprofen (ADVIL/MOTRIN) 400 MG tablet     insulin glargine (LANTUS PEN) 100 UNIT/ML pen     Allergies   Allergen Reactions     Codeine      Other reaction(s): Chest Pain     Compazine      Feels \"in another world\"     Droperidol      Would stare at people, couldn't speak     Propoxyphene      Tramadol Rash     Tylenol With Codeine #3 [Acetaminophen-Codeine] Hives     Vicodin [Hydrocodone-Acetaminophen] Hives     Morphine Itching and Anxiety     became jittery with most recent dose 1/15/ 08 2400     Past medical history, past surgical history, medications, and allergies were reviewed with the patient. Additional pertinent items: None    Social History     Socioeconomic History     Marital status:      Spouse name: Not on file     Number of children: 2     Years of education: Not on file     Highest education level: Not on file   Occupational History     Employer: UNEMPLOYED   Social Needs     Financial resource strain: Not on file     Food insecurity     Worry: Not on file     Inability: Not on file     Transportation needs     Medical: Not on file     Non-medical: Not on file   Tobacco Use     Smoking status: Never Smoker     Smokeless tobacco: Never Used   Substance and Sexual Activity     Alcohol use: No     Drug use: No     Comment: suboxone program; last use of meth and heroin in fall just prior to treatment     Sexual activity: Yes     Partners: Male     Birth " "control/protection: Female Surgical   Lifestyle     Physical activity     Days per week: Not on file     Minutes per session: Not on file     Stress: Not on file   Relationships     Social connections     Talks on phone: Not on file     Gets together: Not on file     Attends Christian service: Not on file     Active member of club or organization: Not on file     Attends meetings of clubs or organizations: Not on file     Relationship status: Not on file     Intimate partner violence     Fear of current or ex partner: Not on file     Emotionally abused: Not on file     Physically abused: Not on file     Forced sexual activity: Not on file   Other Topics Concern     Parent/sibling w/ CABG, MI or angioplasty before 65F 55M? Not Asked   Social History Narrative     Not on file     Social history was reviewed with the patient. Additional pertinent items: None    Review of Systems  General: No fevers or chills  Skin: No rash or diaphoresis  Eyes: No eye redness or discharge  Ears/Nose/Throat: See HPI  Respiratory: No cough or SOB  Cardiovascular: No chest pain or palpitations  Gastrointestinal: No nausea, vomiting, or diarrhea  Genitourinary: No urinary frequency, hematuria, or dysuria  Musculoskeletal: No arthralgias or myalgias  Neurologic: No numbness or weakness  Psychiatric: No depression or SI  Hematologic/Lymphatic/Immunologic: No leg swelling, no easy bruising/bleeding  Endocrine: No polyuria/polydypsia    A complete review of systems was performed with pertinent positives and negatives noted in the HPI, and all other systems negative.    Physical Exam   BP: (!) 157/96  Pulse: 73  Temp: 98.7  F (37.1  C)  Resp: 18  Height: 162.6 cm (5' 4\")  Weight: 79 kg (174 lb 3.2 oz)  SpO2: 100 %      General: Well nourished, well developed, NAD  HEENT: EOMI, anicteric. NCAT, MMM, poor dentition, 3 residual maxillary teeth with caries, tender to palpation, no surrounding gingival abscess visualized.  Left lower incisor is " broken, left molars with dental caries  Neck: no jugular venous distension, supple, nl ROM  Cardiac: Regular rate, extremities well-perfused  Pulm: Airway patent, nonlabored breathing  Skin: Warm and dry to the touch.  No rash  Extremities: No LE edema, no cyanosis, w/w/p  Neuro: A&Ox3, no gross focal deficits    ED Course        Procedures                           Labs Ordered and Resulted from Time of ED Arrival Up to the Time of Departure from the ED - No data to display         No results found for this or any previous visit (from the past 24 hour(s)).    Labs, vital signs, and imaging studies were reviewed by me.    Medications   oxyCODONE-acetaminophen (PERCOCET) 5-325 MG per tablet 1 tablet (has no administration in time range)   penicillin V (VEETID) tablet 500 mg (has no administration in time range)       Assessments & Plan (with Medical Decision Making)   Cindy Fisher is a 45 year old female who presents with dental pain.  Dose of antibiotics and medications for pain control were given to the patient to the emergency department.  The patient declined a dental block.    Patient's prescription monitoring report was reviewed and patient has an overdose risk score of 360 (190 narcotic, 90 sedative, 0 stimulant.)  The patient has had 19 prescriptions for controlled substances from 2 different prescribers and 5 different pharmacies over the past year, with 1 being private pay.  The most recent prescription was a 30-day supply of gabapentin filled on 4/29/2021.  There are number of prescriptions for gabapentin as well as a prescription for oxycodone/acetaminophen 5/325 tablets (40 tablets for a 10-day supply) filled on 11/25/2020 as well as 40 tablets of the same (for a 5-day supply) filled on 11/4/2020 and 10/17/2020 (14 tablets for 7-day supply).     I have reviewed the nursing notes.    I have reviewed the findings, diagnosis, plan and need for follow up with the patient.    Patient to be discharged home.  Advised to follow up with dentistry as soon as possible.  Patient was provided with referral to the dental immediate care clinic.  To return to ER immediately with any new/worsening symptoms. Plan of care discussed with patient who expresses understanding and agrees with plan of care.  Patient to be discharged with a prescription for antibiotics to treat for dental infection.      New Prescriptions    BENZOCAINE (ANBESOL) 10 % GEL    Take by mouth 4 times daily as needed for moderate pain    PENICILLIN V (VEETID) 500 MG TABLET    Take 1 tablet (500 mg) by mouth 4 times daily for 10 days       Final diagnoses:   Pain, dental     Jeanette Ireland MD  5/24/2021   [facility] EMERGENCY DEPARTMENT     Jeanette Ireland MD  05/25/21 0104

## 2021-05-27 ENCOUNTER — RECORDS - HEALTHEAST (OUTPATIENT)
Dept: ADMINISTRATIVE | Facility: CLINIC | Age: 46
End: 2021-05-27

## 2021-05-28 ENCOUNTER — RECORDS - HEALTHEAST (OUTPATIENT)
Dept: ADMINISTRATIVE | Facility: CLINIC | Age: 46
End: 2021-05-28

## 2022-01-22 NOTE — PLAN OF CARE
Data: [facility] at 1402. Reason for maternal/fetal assessment per patient is pre term Rule Out Labor  Patient is a . Prenatal record reviewed.      Obstetric History       T3      TAB0   SAB0   E0   M0   L3       # Outcome Date GA Lbr Alpesh/2nd Weight Sex Delivery Anes PTL Lv   4 Current            3 Term  37w0d  2.75 kg (6 lb 1 oz)     Y   2 Term 96 38w0d  2.977 kg (6 lb 9 oz) F    Y   1 Term 93 40w0d  4.252 kg (9 lb 6 oz) M    Y         Medical History:   Past Medical History   Diagnosis Date     Anemia      Anxiety      Migraine, unspecified, without mention of intractable migraine without mention of status migrainosus      Migraine     Postpartum depression      Type II or unspecified type diabetes mellitus without mention of complication, not stated as uncontrolled summer 2006     Diabetes mellitus     Uncomplicated asthma      with cold sx   . Gestational Age 32w5d. VSS. Cervix: closed.  Fetal movement present. Patient denies backache, vaginal discharge, pelvic pressure, UTI symptoms, GI problems, bloody show, vaginal bleeding, edema, headache, visual disturbances, epigastric or URQ pain, rupture of membranes. Support persons mother of FOB present.  Action: Verbal consent for EFM. Triage assessment completed. EFM applied for monitoring. Uterine assessment ctx's X2. Fetal assessment: Presumed adequate fetal oxygenation documented (see flow record). Patient instructed to report change in fetal movement, vaginal leaking of fluid or bleeding, abdominal pain, or any concerns related to the pregnancy to her nurse/physician. Pt states feeling better. Blayne ctx's.  Response: Dr. Hilliard informed of pt status and arrival. Plan per provider after assessment is D/C home. Patient verbalized understanding of education and verbalized agreement with plan. Discharged ambulatory at  1710. Ambulating with [facility]   dependent (less than 25% patients effort)

## 2023-04-19 ENCOUNTER — HOSPITAL ENCOUNTER (EMERGENCY)
Facility: CLINIC | Age: 48
Discharge: HOME | End: 2023-04-19
Attending: EMERGENCY MEDICINE | Admitting: EMERGENCY MEDICINE
Payer: COMMERCIAL

## 2023-04-19 VITALS
HEART RATE: 74 BPM | RESPIRATION RATE: 18 BRPM | SYSTOLIC BLOOD PRESSURE: 104 MMHG | TEMPERATURE: 98.4 F | OXYGEN SATURATION: 99 % | DIASTOLIC BLOOD PRESSURE: 57 MMHG

## 2023-04-19 DIAGNOSIS — K59.03 DRUG-INDUCED CONSTIPATION: ICD-10-CM

## 2023-04-19 PROCEDURE — 99283 EMERGENCY DEPT VISIT LOW MDM: CPT | Performed by: EMERGENCY MEDICINE

## 2023-04-19 PROCEDURE — 250N000013 HC RX MED GY IP 250 OP 250 PS 637: Performed by: EMERGENCY MEDICINE

## 2023-04-19 PROCEDURE — 99284 EMERGENCY DEPT VISIT MOD MDM: CPT | Performed by: EMERGENCY MEDICINE

## 2023-04-19 RX ORDER — DIAZEPAM 5 MG
5 TABLET ORAL ONCE
Status: COMPLETED | OUTPATIENT
Start: 2023-04-19 | End: 2023-04-19

## 2023-04-19 RX ORDER — ACETAMINOPHEN 325 MG/1
975 TABLET ORAL ONCE
Status: COMPLETED | OUTPATIENT
Start: 2023-04-19 | End: 2023-04-19

## 2023-04-19 RX ORDER — IBUPROFEN 600 MG/1
600 TABLET, FILM COATED ORAL ONCE
Status: COMPLETED | OUTPATIENT
Start: 2023-04-19 | End: 2023-04-19

## 2023-04-19 RX ORDER — POLYETHYLENE GLYCOL 3350 17 G/17G
1 POWDER, FOR SOLUTION ORAL DAILY
Qty: 238 G | Refills: 0 | Status: SHIPPED | OUTPATIENT
Start: 2023-04-19 | End: 2023-05-03

## 2023-04-19 RX ADMIN — IBUPROFEN 600 MG: 600 TABLET, FILM COATED ORAL at 21:27

## 2023-04-19 RX ADMIN — ACETAMINOPHEN 975 MG: 325 TABLET ORAL at 21:27

## 2023-04-19 RX ADMIN — Medication 226 ML: at 21:04

## 2023-04-19 RX ADMIN — DIAZEPAM 5 MG: 5 TABLET ORAL at 22:04

## 2023-04-19 ASSESSMENT — ACTIVITIES OF DAILY LIVING (ADL): ADLS_ACUITY_SCORE: 35

## 2023-04-20 NOTE — DISCHARGE INSTRUCTIONS
Drink plenty of fluids to prevent dehydration  Start MiraLAX as directed to prevent this from happening again

## 2023-04-20 NOTE — ED PROVIDER NOTES
"      [facility] EMERGENCY DEPARTMENT ([facility])  April 19, 2023     History     Chief Complaint   Patient presents with     Rectal/perineal Pain     Constipation     HPI  Cindy Fisher is a 47 year old female with a past medical history which includes DM2, DKA, heroin abuse, opioid withdrawal who presents to the Emergency Department for evaluation of constipation and rectal pain. Patient reports she has been constipated for the past 2 hours after she took pain pills for back pain. She indicates that her rectum is painful, and that she feels like there is stool \"right there\" at her rectum. States she has had a similar episode 1 time before.     Past Medical History  Past Medical History:   Diagnosis Date     Anemia      Anxiety      Migraine, unspecified, without mention of intractable migraine without mention of status migrainosus     Migraine     Postpartum depression      Type II or unspecified type diabetes mellitus without mention of complication, not stated as uncontrolled summer 2006    Diabetes mellitus     Uncomplicated asthma     with cold sx     Past Surgical History:   Procedure Laterality Date     APPENDECTOMY       CHOLECYSTECTOMY       LAPAROTOMY MINI, TUBAL LIGATION (POST PARTUM), COMBINED Bilateral 3/10/2017    Procedure: COMBINED LAPAROTOMY MINI, TUBAL LIGATION (POST PARTUM);  Surgeon: Elinor Reyes MD;  Location: [facility] OR     polyethylene glycol (MIRALAX) 17 GM/Dose powder  benzocaine (ANBESOL) 10 % gel  blood glucose monitoring (ACCU-CHEK FASTCLIX) lancets  blood glucose monitoring (ACCU-CHEK LYNN SMARTVIEW) meter device kit  ibuprofen (ADVIL/MOTRIN) 400 MG tablet  insulin glargine (LANTUS PEN) 100 UNIT/ML pen      Allergies   Allergen Reactions     Codeine      Other reaction(s): Chest Pain     Compazine      Feels \"in another world\"     Droperidol      Would stare at people, couldn't speak     Propoxyphene      Tramadol Rash     Tylenol With Codeine #3 [Acetaminophen-Codeine] Hives "     Vicodin [Hydrocodone-Acetaminophen] Hives     Morphine Itching and Anxiety     became jittery with most recent dose 1/15/ 08 2400     Family History  Family History   Problem Relation Age of Onset     Family History Negative No family hx of      Social History   Social History     Tobacco Use     Smoking status: Never     Smokeless tobacco: Never   Substance Use Topics     Alcohol use: No     Drug use: No     Comment: suboxone program; last use of meth and heroin in fall just prior to treatment      Past medical history, past surgical history, medications, allergies, family history, and social history were reviewed with the patient. No additional pertinent items.      A medically appropriate review of systems was performed with pertinent positives and negatives noted in the HPI, and all other systems negative.    Physical Exam   BP: (!) 149/87  Pulse: 107  Temp: 98.4  F (36.9  C)  Resp: 16  SpO2: 97 %  Physical Exam  Vitals and nursing note reviewed.   Constitutional:       General: She is in acute distress.   Cardiovascular:      Rate and Rhythm: Normal rate and regular rhythm.   Abdominal:      General: Abdomen is flat.   Genitourinary:     Comments: Large stool ball is attempting to be passed.  Patient is unable to secondary to pain.  Delivery of the stool was assisted by the nurse  Neurological:      Mental Status: She is alert.           ED Course, Procedures, & Data     8:07 PM  The patient was seen and examined by Joseph Fields MD in triage.    Procedures              No results found for any visits on 04/19/23.  Medications   Enema Compound (docusate/mineral oil/NaPhos) NO MAG CIT PREMIX (226 mLs Rectal $Given 4/19/23 2104)   acetaminophen (TYLENOL) tablet 975 mg (975 mg Oral $Given 4/19/23 2127)   ibuprofen (ADVIL/MOTRIN) tablet 600 mg (600 mg Oral $Given 4/19/23 2127)   diazepam (VALIUM) tablet 5 mg (5 mg Oral $Given 4/19/23 2204)     Labs Ordered and Resulted from Time of ED Arrival to Time of ED  Departure - No data to display  No orders to display          Critical care was not performed.     Medical Decision Making  The patient's presentation was of moderate complexity (an acute illness with systemic symptoms).    The patient's evaluation involved:  history and exam without other MDM data elements    The patient's management necessitated moderate risk (a decision regarding minor procedure (Stool disimpaction) with risk factors of none).      Assessment & Plan    47-year-old female with history of opiate use presents with severe constipation unable to pass a large stool bolus.  She was in considerable distress on arrival.  She was manually disimpacted by the nurse with me standing by the bed.  A large amount of stool was returned.  We then proceeded with the enema.  She feels much better now and we will discharge her home with a prescription for MiraLAX which we encouraged her to take so long as she is using opiates to prevent this from happening again.  There was no blood and her abdomen is not tender and not concerned about perforation.    I have reviewed the nursing notes. I have reviewed the findings, diagnosis, plan and need for follow up with the patient.    New Prescriptions    POLYETHYLENE GLYCOL (MIRALAX) 17 GM/DOSE POWDER    Take 17 g (1 capful.) by mouth daily for 14 days       Final diagnoses:   Drug-induced constipation     ICrystal, am serving as a trained medical scribe to document services personally performed by Joseph Fields MD, based on the provider's statements to me.   Joseph LORA MD, was physically present and have reviewed and verified the accuracy of this note documented by Crystal Gallo.    Joseph Fields MD  [facility] EMERGENCY DEPARTMENT  4/19/2023     Joseph Fields MD  04/19/23 6067

## 2023-04-20 NOTE — ED TRIAGE NOTES
"Presents with complaints of constipation and rectal pain due to feeling as though the stool is \"right there.\" Tearful in triage with reports of 10/10 rectal pain.      Triage Assessment     Row Name 04/19/23 1956       Triage Assessment (Adult)    Airway WDL WDL       Respiratory WDL    Respiratory WDL WDL       Skin Circulation/Temperature WDL    Skin Circulation/Temperature WDL WDL       Cardiac WDL    Cardiac WDL X;rhythm    Pulse Rate & Regularity tachycardic       Peripheral/Neurovascular WDL    Peripheral Neurovascular WDL WDL       Cognitive/Neuro/Behavioral WDL    Cognitive/Neuro/Behavioral WDL WDL       [place] Coma Scale    Best Eye Response 4-->(E4) spontaneous    Best Motor Response 6-->(M6) obeys commands    Best Verbal Response 5-->(V5) oriented    [place] Coma Scale Score 15              "

## 2023-09-27 ENCOUNTER — MEDICAL CORRESPONDENCE (OUTPATIENT)
Dept: HEALTH INFORMATION MANAGEMENT | Facility: CLINIC | Age: 48
End: 2023-09-27
Payer: COMMERCIAL

## 2023-10-03 ENCOUNTER — TRANSCRIBE ORDERS (OUTPATIENT)
Dept: OTHER | Age: 48
End: 2023-10-03

## 2023-10-03 DIAGNOSIS — Z86.16 PERSONAL HISTORY OF COVID-19: ICD-10-CM

## 2023-10-03 DIAGNOSIS — J45.30 MILD PERSISTENT ASTHMA: Primary | ICD-10-CM

## 2023-10-03 DIAGNOSIS — I10 BENIGN ESSENTIAL HYPERTENSION: ICD-10-CM

## 2023-10-04 DIAGNOSIS — J45.30 MILD PERSISTENT ASTHMA: Primary | ICD-10-CM

## 2023-12-01 NOTE — CONFIDENTIAL NOTE
RECORDS RECEIVED FROM: internal /external    DATE RECEIVED: 12.15.23    NOTES STATUS DETAILS   OFFICE NOTE from referring provider External   [facility], Dr. Mallika Hudson    OFFICE NOTE from other specialist     DISCHARGE SUMMARY from hospital     DISCHARGE REPORT from the ER     MEDICATION LIST internal     IMAGING  (NEED IMAGES AND REPORTS)     CT SCAN     CHEST XRAY (CXR) internal  Scheduled 12.15.23    TESTS     PULMONARY FUNCTION TESTING (PFT) internal  Scheduled 12.15.23   10.4.23

## 2023-12-15 ENCOUNTER — PRE VISIT (OUTPATIENT)
Dept: PULMONOLOGY | Facility: CLINIC | Age: 48
End: 2023-12-15

## 2025-05-16 NOTE — PLAN OF CARE
" 32+5 here via ambulance from clinic where she was alonso q 5 minutes that increased to ever minute.  She is already roomed and on the monitor prior to my arrival.  Admission data base collected and Dr Hilliard notified via pager.  FHTs reactive.  Pt had 2 contractions in 30 minutes.  PT has extensive social / poly substance abuse hx as well as mental health issues that she is receiving treatment for.  Pt is accompanied by a [facility] Counselor who refers to Cindy as a \"star patient\"  Requested pt give urine sample and she is unable at the present time.  Will await for Dr Hilliard's assessment and plan.  " Private Residence

## 2025-07-15 ENCOUNTER — TRANSFERRED RECORDS (OUTPATIENT)
Dept: HEALTH INFORMATION MANAGEMENT | Facility: CLINIC | Age: 50
End: 2025-07-15

## 2025-07-21 ENCOUNTER — TRANSCRIBE ORDERS (OUTPATIENT)
Dept: OTHER | Age: 50
End: 2025-07-21

## 2025-07-21 DIAGNOSIS — N18.31 STAGE 3A CHRONIC KIDNEY DISEASE (CKD) (H): Primary | ICD-10-CM

## 2025-07-22 ENCOUNTER — PATIENT OUTREACH (OUTPATIENT)
Dept: CARE COORDINATION | Facility: CLINIC | Age: 50
End: 2025-07-22

## 2025-07-24 ENCOUNTER — PATIENT OUTREACH (OUTPATIENT)
Dept: CARE COORDINATION | Facility: CLINIC | Age: 50
End: 2025-07-24
Payer: COMMERCIAL

## 2025-07-30 NOTE — CONFIDENTIAL NOTE
DIAGNOSIS:  Stage 3a chronic kidney disease (CKD)    DATE RECEIVED:  10.23.2025     NOTES STATUS DETAILS   OFFICE NOTE from referring provider In process Mallika Hudson MD [facility]    OFFICE NOTE from other specialist      *Only VASCULITIS or LUPUS gather office notes for the following     *PULMONARY       *ENT     *DERMATOLOGY     *RHEUMATOLOGY     DISCHARGE SUMMARY from hospital     DISCHARGE REPORT from the ER     MEDICATION LIST     IMAGING  (NEED IMAGES AND REPORTS)     KIDNEY CT SCAN     KIDNEY ULTRASOUND     MR ABDOMEN     NUCLEAR MEDICINE RENAL     LABS     CBC     CMP     BMP     UA     URINE PROTEIN     RENAL PANEL     BIOPSY     KIDNEY BIOPSY

## 2025-10-23 ENCOUNTER — PRE VISIT (OUTPATIENT)
Dept: NEPHROLOGY | Facility: CLINIC | Age: 50
End: 2025-10-23

## (undated) DEVICE — PAD PERI INDIV WRAP 11" 2022A

## (undated) DEVICE — GOWN REINFORCED XXLG 9071

## (undated) DEVICE — LINEN GOWN X4 5410

## (undated) DEVICE — Device

## (undated) DEVICE — SOL NACL 0.9% IRRIG 1000ML BOTTLE 2F7124

## (undated) DEVICE — SU PLAIN 0 TIE 54" S104H

## (undated) DEVICE — SU PDS II 4-0 PS-2 18" Z496G

## (undated) DEVICE — DRSG STERI STRIP 1/2X4" R1547

## (undated) DEVICE — TUBING SUCTION MEDI-VAC 1/4"X20' N620A

## (undated) DEVICE — SUCTION MANIFOLD DORNOCH ULTRA CART UL-CL500

## (undated) DEVICE — DRAPE LAP W/ARMBOARD 29410

## (undated) DEVICE — DRSG PRIMAPORE 02X3" 7133

## (undated) DEVICE — GLOVE PROTEXIS W/NEU-THERA 6.5  2D73TE65

## (undated) DEVICE — SUCTION TIP YANKAUER W/O VENT K86

## (undated) DEVICE — SYR EAR BULB 3OZ 0035830

## (undated) DEVICE — GLOVE PROTEXIS BLUE W/NEU-THERA 6.5  2D73EB65

## (undated) DEVICE — ESU GROUND PAD UNIVERSAL W/O CORD

## (undated) DEVICE — SU VICRYL 0 UR-6 27" J603H

## (undated) DEVICE — SOL WATER IRRIG 1000ML BOTTLE 2F7114

## (undated) DEVICE — SOL ADH LIQUID BENZOIN SWAB 0.6ML C1544

## (undated) DEVICE — DRSG TELFA 3X8" 1238

## (undated) DEVICE — PREP CHLORAPREP 26ML TINTED ORANGE  260815

## (undated) DEVICE — LINEN ORTHO PACK 5446

## (undated) DEVICE — STRAP KNEE/BODY 31143004